# Patient Record
Sex: FEMALE | Race: WHITE
[De-identification: names, ages, dates, MRNs, and addresses within clinical notes are randomized per-mention and may not be internally consistent; named-entity substitution may affect disease eponyms.]

---

## 2023-02-07 ENCOUNTER — HOSPITAL ENCOUNTER (INPATIENT)
Dept: HOSPITAL 54 - TELE | Age: 68
LOS: 11 days | Discharge: HOSPICE | DRG: 871 | End: 2023-02-18
Attending: INTERNAL MEDICINE | Admitting: INTERNAL MEDICINE
Payer: MEDICARE

## 2023-02-07 VITALS — BODY MASS INDEX: 17.43 KG/M2 | WEIGHT: 92.31 LBS | HEIGHT: 61 IN

## 2023-02-07 VITALS — SYSTOLIC BLOOD PRESSURE: 100 MMHG | DIASTOLIC BLOOD PRESSURE: 58 MMHG

## 2023-02-07 DIAGNOSIS — Z99.3: ICD-10-CM

## 2023-02-07 DIAGNOSIS — L89.220: ICD-10-CM

## 2023-02-07 DIAGNOSIS — E88.09: ICD-10-CM

## 2023-02-07 DIAGNOSIS — Z79.899: ICD-10-CM

## 2023-02-07 DIAGNOSIS — J18.9: ICD-10-CM

## 2023-02-07 DIAGNOSIS — E11.22: ICD-10-CM

## 2023-02-07 DIAGNOSIS — E87.6: ICD-10-CM

## 2023-02-07 DIAGNOSIS — J90: ICD-10-CM

## 2023-02-07 DIAGNOSIS — F05: ICD-10-CM

## 2023-02-07 DIAGNOSIS — Z20.822: ICD-10-CM

## 2023-02-07 DIAGNOSIS — N18.6: ICD-10-CM

## 2023-02-07 DIAGNOSIS — I82.422: ICD-10-CM

## 2023-02-07 DIAGNOSIS — A41.50: Primary | ICD-10-CM

## 2023-02-07 DIAGNOSIS — L89.152: ICD-10-CM

## 2023-02-07 DIAGNOSIS — R13.10: ICD-10-CM

## 2023-02-07 DIAGNOSIS — D69.6: ICD-10-CM

## 2023-02-07 DIAGNOSIS — I82.220: ICD-10-CM

## 2023-02-07 DIAGNOSIS — J98.11: ICD-10-CM

## 2023-02-07 DIAGNOSIS — M89.8X9: ICD-10-CM

## 2023-02-07 DIAGNOSIS — B96.1: ICD-10-CM

## 2023-02-07 DIAGNOSIS — F29: ICD-10-CM

## 2023-02-07 DIAGNOSIS — Z99.2: ICD-10-CM

## 2023-02-07 DIAGNOSIS — R41.9: ICD-10-CM

## 2023-02-07 DIAGNOSIS — L89.310: ICD-10-CM

## 2023-02-07 DIAGNOSIS — G92.8: ICD-10-CM

## 2023-02-07 DIAGNOSIS — I12.0: ICD-10-CM

## 2023-02-07 LAB
BASOPHILS # BLD AUTO: 0 K/UL (ref 0–0.2)
BASOPHILS NFR BLD AUTO: 0.2 % (ref 0–2)
BUN SERPL-MCNC: 70 MG/DL (ref 7–18)
CALCIUM SERPL-MCNC: 6.5 MG/DL (ref 8.5–10.1)
CHLORIDE SERPL-SCNC: 102 MMOL/L (ref 98–107)
CO2 SERPL-SCNC: 18 MMOL/L (ref 21–32)
CREAT SERPL-MCNC: 6.6 MG/DL (ref 0.6–1.3)
EOSINOPHIL NFR BLD AUTO: 0.1 % (ref 0–6)
GLUCOSE SERPL-MCNC: 99 MG/DL (ref 74–106)
HCT VFR BLD AUTO: 43 % (ref 33–45)
HGB BLD-MCNC: 13.2 G/DL (ref 11.5–14.8)
LYMPHOCYTES NFR BLD AUTO: 0.7 K/UL (ref 0.8–4.8)
LYMPHOCYTES NFR BLD AUTO: 3.7 % (ref 20–44)
MCHC RBC AUTO-ENTMCNC: 31 G/DL (ref 31–36)
MCV RBC AUTO: 97 FL (ref 82–100)
MONOCYTES NFR BLD AUTO: 0.5 K/UL (ref 0.1–1.3)
MONOCYTES NFR BLD AUTO: 2.7 % (ref 2–12)
NEUTROPHILS # BLD AUTO: 17.9 K/UL (ref 1.8–8.9)
NEUTROPHILS NFR BLD AUTO: 93.3 % (ref 43–81)
PLATELET # BLD AUTO: 150 K/UL (ref 150–450)
POTASSIUM SERPL-SCNC: 3.1 MMOL/L (ref 3.5–5.1)
RBC # BLD AUTO: 4.46 MIL/UL (ref 4–5.2)
SODIUM SERPL-SCNC: 138 MMOL/L (ref 136–145)
WBC NRBC COR # BLD AUTO: 19.1 K/UL (ref 4.3–11)

## 2023-02-07 PROCEDURE — G0378 HOSPITAL OBSERVATION PER HR: HCPCS

## 2023-02-07 PROCEDURE — C1894 INTRO/SHEATH, NON-LASER: HCPCS

## 2023-02-07 PROCEDURE — C1769 GUIDE WIRE: HCPCS

## 2023-02-07 PROCEDURE — A4223 INFUSION SUPPLIES W/O PUMP: HCPCS

## 2023-02-07 PROCEDURE — C1757 CATH, THROMBECTOMY/EMBOLECT: HCPCS

## 2023-02-07 RX ADMIN — MIDODRINE HYDROCHLORIDE SCH MG: 5 TABLET ORAL at 09:31

## 2023-02-07 RX ADMIN — CINACALCET HYDROCHLORIDE SCH MG: 30 TABLET, COATED ORAL at 12:57

## 2023-02-07 RX ADMIN — RENAGEL SCH MG: 800 TABLET ORAL at 17:24

## 2023-02-07 RX ADMIN — HEPARIN SODIUM SCH UNITS: 5000 INJECTION INTRAVENOUS; SUBCUTANEOUS at 11:36

## 2023-02-07 RX ADMIN — DEXTROSE MONOHYDRATE SCH MLS/HR: 50 INJECTION, SOLUTION INTRAVENOUS at 04:00

## 2023-02-07 RX ADMIN — DEXTROSE MONOHYDRATE SCH MLS/HR: 50 INJECTION, SOLUTION INTRAVENOUS at 21:00

## 2023-02-07 RX ADMIN — MIDODRINE HYDROCHLORIDE SCH MG: 5 TABLET ORAL at 12:57

## 2023-02-07 RX ADMIN — HEPARIN SODIUM SCH UNITS: 5000 INJECTION INTRAVENOUS; SUBCUTANEOUS at 19:04

## 2023-02-07 RX ADMIN — AMLODIPINE BESYLATE SCH MG: 5 TABLET ORAL at 09:00

## 2023-02-07 RX ADMIN — VITAMIN D, TAB 1000IU (100/BT) SCH UNIT: 25 TAB at 09:30

## 2023-02-07 RX ADMIN — MIDODRINE HYDROCHLORIDE SCH MG: 5 TABLET ORAL at 04:00

## 2023-02-07 RX ADMIN — RENAGEL SCH MG: 800 TABLET ORAL at 12:57

## 2023-02-07 RX ADMIN — MIDODRINE HYDROCHLORIDE SCH MG: 5 TABLET ORAL at 16:02

## 2023-02-07 RX ADMIN — DEXTROSE MONOHYDRATE SCH MLS/HR: 50 INJECTION, SOLUTION INTRAVENOUS at 10:49

## 2023-02-07 RX ADMIN — RENAGEL SCH MG: 800 TABLET ORAL at 09:30

## 2023-02-07 RX ADMIN — FAMOTIDINE SCH MG: 20 TABLET, FILM COATED ORAL at 09:30

## 2023-02-07 RX ADMIN — FOLIC ACID SCH MG: 1 TABLET ORAL at 09:31

## 2023-02-07 NOTE — NUR
RN TELE NOTES

DR. RODRIGUEZ AND DR. BRUNO INFORMED OF ABNORMAL LAB RESULTS, DR. RODRIGUEZ ORDERED KCL 10MEQ 
REPLACEMENT, SON MARIAM AT BEDSIDE, CONSENT GIVEN FOR HEMODIALYSIS TO BE DONE TOMORROW PER MD, 
PT HAS HD CATH AT THE LEFT GROIN, NO BLEEDING NOTED.

## 2023-02-07 NOTE — NUR
RN TELE NOTES

PT IN BED, RESTING, ALERT TO SELF, WITH CONFUSION, NOT IN DISTRESS, ON ROOM AIR WITH O2 SAT 
OF 97%, SON MARIAM AT BEDSIDE, ASSISTED PT WITH MEALS, NOTED IV SITE INFILTRATED AT LEFT 
FOREARM, RECORDS SHOWED OLD AV SHUNT AT LEFT ARM, CONFIRMED WITH SON, ATTEMPTED IV 
REINSERTION AT RIGHT ARM BUT UNSUCCESSFUL, PT IS HARDSTICK, DR. BRUNO INFORMED, ORDERED 
MIDLINE INSERTION, NURSING SUPERVISOR INFORMED.

## 2023-02-07 NOTE — NUR
TELE RN ADMISSION NOTES



RECEIVED PATIENT AS DIRECT ADMIT FROM [place]. PATIENT IS Polish SPEAKING BUT UNABLE TO 
COMMUNICATE DUE TO MENTAL STATUS. A/O X 1. NO S/S OF PAIN NOTED AT THIS TIME. PATIENT ON RA, 
BREATHING EVEN AND UNLABORED, NO DISTRESS OR SHORTNESS OF BREATH. IV ACCESS LAC #22G, SALINE 
LOCK, INTACT PATENT AND FLUSHING WELL. FALL AND SAFETY MEASURESIN PLACE WITH BED ON LOWEST 
AND LOCKED POSITION. BED ALARM ON. SIDE RAILS UP X 2. TRAY AND CALL LIGHT WITHIN EASY REACH. 
WILL CONTINUE WITH THE PLAN OF CARE.

## 2023-02-07 NOTE — NUR
TELE RN OPENING NOTE



RECEIVED PATIENT SLEEPING IN BED. EASILY AWAKEN BY VERBAL AND TACTILE STIMULI. PATIENT IS 
Prydeinig SPEAKING, A/O X 1. NO S/S OF PAIN NOTED AT THIS TIME. PATIENT ON 4L OXYGEN VIA NC. 
IV ACCESS TO LEFT AC #22 G, INFILTRATED. PT HAS ORDER FOR MIDLINE INSERTION. AWAITING FOR 
MIDLINE NURSE TO COME, AND INSERT MIDLINE. CHARGE NURSE AWARE. HAS AN OLD RIGHT AV SHUNT. ON 
TELE MONITOR, WITH SR @ 67. PT'S BP: 95/57. PATIENT IS ON PUREWICK. SAFETY MEASURES IN 
PLACE: BED ON LOWEST AND LOCKED POSITION. BED ALARM ON. SIDE RAILS UP X 2. CALL LIGHT AND 
TABLE WITHIN EASY REACH. WILL CONTINUE TO MONITOR PT.

## 2023-02-07 NOTE — NUR
RN TELE NOTES

PT IN BED, ASLEEP, EASY TO AROUSE, NO SIGN OF PAIN OR DISTRESS, CALL LIGHT WITHIN REACH, 
KEPT COMFORTABLE IN BED.

## 2023-02-07 NOTE — NUR
TELE RN CLOSING NOTES



PATIENT IS SLEEPING IN BED. EASILY AWAKEN BY VERBAL AND TACTILE STIMULI. PATIENT IS Kittitian 
SPEAKING. A/O X 1. NO S/S OF PAIN NOTED AT THIS TIME. PATIENT ON 4L OXYGEN VIA NC, 
CONTINUOUSLY REMOVING IT. IV ACCESS LAC #22 G, INTACT AND PATENT, FLUSHING WELL. HAS AN OLD 
RIGHT AV SHUNT. ON TELE MONITOR, WITH SR @ 72. PATIENT IS ON PUREWICK. SAFETY MEASURES GIVEN 
WITH BED ON LOWEST AND LOCKED POSITION. BED ALRAM ON. SIDE RAILS UP X 2. CALL LIGHT AND 
TABLE WITHIN EASY REACH. WILL ENDORSE TO THE NEXT SHIFT FOR OPAL.

## 2023-02-08 VITALS — DIASTOLIC BLOOD PRESSURE: 68 MMHG | SYSTOLIC BLOOD PRESSURE: 144 MMHG

## 2023-02-08 VITALS — SYSTOLIC BLOOD PRESSURE: 100 MMHG | DIASTOLIC BLOOD PRESSURE: 46 MMHG

## 2023-02-08 VITALS — DIASTOLIC BLOOD PRESSURE: 74 MMHG | SYSTOLIC BLOOD PRESSURE: 100 MMHG

## 2023-02-08 VITALS — SYSTOLIC BLOOD PRESSURE: 155 MMHG | DIASTOLIC BLOOD PRESSURE: 84 MMHG

## 2023-02-08 LAB
ALBUMIN SERPL BCP-MCNC: 2.3 G/DL (ref 3.4–5)
ALP SERPL-CCNC: 506 U/L (ref 46–116)
ALT SERPL W P-5'-P-CCNC: 50 U/L (ref 12–78)
AST SERPL W P-5'-P-CCNC: 47 U/L (ref 15–37)
BASOPHILS # BLD AUTO: 0 K/UL (ref 0–0.2)
BASOPHILS NFR BLD AUTO: 0 % (ref 0–2)
BILIRUB DIRECT SERPL-MCNC: 0.2 MG/DL (ref 0–0.2)
BILIRUB SERPL-MCNC: 0.4 MG/DL (ref 0.2–1)
BUN SERPL-MCNC: 74 MG/DL (ref 7–18)
CALCIUM SERPL-MCNC: 6.8 MG/DL (ref 8.5–10.1)
CHLORIDE SERPL-SCNC: 99 MMOL/L (ref 98–107)
CO2 SERPL-SCNC: 19 MMOL/L (ref 21–32)
CREAT SERPL-MCNC: 7 MG/DL (ref 0.6–1.3)
EOSINOPHIL NFR BLD AUTO: 0.1 % (ref 0–6)
GLUCOSE SERPL-MCNC: 118 MG/DL (ref 74–106)
HCT VFR BLD AUTO: 43 % (ref 33–45)
HGB BLD-MCNC: 13.4 G/DL (ref 11.5–14.8)
LYMPHOCYTES NFR BLD AUTO: 0.5 K/UL (ref 0.8–4.8)
LYMPHOCYTES NFR BLD AUTO: 3.5 % (ref 20–44)
MAGNESIUM SERPL-MCNC: 2.2 MG/DL (ref 1.8–2.4)
MCHC RBC AUTO-ENTMCNC: 31 G/DL (ref 31–36)
MCV RBC AUTO: 95 FL (ref 82–100)
MONOCYTES NFR BLD AUTO: 0.4 K/UL (ref 0.1–1.3)
MONOCYTES NFR BLD AUTO: 2.7 % (ref 2–12)
NEUTROPHILS # BLD AUTO: 12.4 K/UL (ref 1.8–8.9)
NEUTROPHILS NFR BLD AUTO: 93.7 % (ref 43–81)
PHOSPHATE SERPL-MCNC: 6.3 MG/DL (ref 2.5–4.9)
PLATELET # BLD AUTO: 159 K/UL (ref 150–450)
POTASSIUM SERPL-SCNC: 3.2 MMOL/L (ref 3.5–5.1)
PROT SERPL-MCNC: 5.9 G/DL (ref 6.4–8.2)
RBC # BLD AUTO: 4.55 MIL/UL (ref 4–5.2)
SODIUM SERPL-SCNC: 136 MMOL/L (ref 136–145)
WBC NRBC COR # BLD AUTO: 13.2 K/UL (ref 4.3–11)

## 2023-02-08 PROCEDURE — 05HB33Z INSERTION OF INFUSION DEVICE INTO RIGHT BASILIC VEIN, PERCUTANEOUS APPROACH: ICD-10-PCS | Performed by: NURSE PRACTITIONER

## 2023-02-08 PROCEDURE — 5A1D70Z PERFORMANCE OF URINARY FILTRATION, INTERMITTENT, LESS THAN 6 HOURS PER DAY: ICD-10-PCS

## 2023-02-08 RX ADMIN — VITAMIN D, TAB 1000IU (100/BT) SCH UNIT: 25 TAB at 09:25

## 2023-02-08 RX ADMIN — DEXTROSE MONOHYDRATE SCH MLS/HR: 50 INJECTION, SOLUTION INTRAVENOUS at 09:46

## 2023-02-08 RX ADMIN — CEFEPIME HYDROCHLORIDE SCH MLS/HR: 1 INJECTION, POWDER, FOR SOLUTION INTRAMUSCULAR; INTRAVENOUS at 17:20

## 2023-02-08 RX ADMIN — CINACALCET HYDROCHLORIDE SCH MG: 30 TABLET, COATED ORAL at 09:38

## 2023-02-08 RX ADMIN — MIDODRINE HYDROCHLORIDE SCH MG: 5 TABLET ORAL at 09:26

## 2023-02-08 RX ADMIN — MIDODRINE HYDROCHLORIDE SCH MG: 5 TABLET ORAL at 14:15

## 2023-02-08 RX ADMIN — MIDODRINE HYDROCHLORIDE SCH MG: 5 TABLET ORAL at 17:34

## 2023-02-08 RX ADMIN — HEPARIN SODIUM SCH UNITS: 5000 INJECTION INTRAVENOUS; SUBCUTANEOUS at 09:32

## 2023-02-08 RX ADMIN — AMLODIPINE BESYLATE SCH MG: 5 TABLET ORAL at 09:00

## 2023-02-08 RX ADMIN — RENAGEL SCH MG: 800 TABLET ORAL at 09:27

## 2023-02-08 RX ADMIN — FAMOTIDINE SCH MG: 20 TABLET, FILM COATED ORAL at 09:25

## 2023-02-08 RX ADMIN — HEPARIN SODIUM SCH UNITS: 5000 INJECTION INTRAVENOUS; SUBCUTANEOUS at 17:49

## 2023-02-08 RX ADMIN — RENAGEL SCH MG: 800 TABLET ORAL at 14:15

## 2023-02-08 RX ADMIN — FOLIC ACID SCH MG: 1 TABLET ORAL at 09:27

## 2023-02-08 RX ADMIN — RENAGEL SCH MG: 800 TABLET ORAL at 17:34

## 2023-02-08 NOTE — NUR
TELE RN CLOSING NOTE



LEFT PATIENT SLEEPING IN BED. EASILY AWAKEN BY VERBAL AND TACTILE STIMULI. PATIENT IS 
Faroese SPEAKING, A/O X 1. NO S/S OF PAIN NOTED AT THIS TIME. PATIENT ON 4L OXYGEN VIA NC. 
IV ACCESS TO LEFT AC #22 G, INFILTRATED. PT HAS ORDER FOR MIDLINE INSERTION. AWAITING FOR 
MIDLINE NURSE TO COME, AND INSERT MIDLINE. CHARGE NURSE, AND MD DURHAM AWARE. HAS AN OLD 
RIGHT AV SHUNT. ON TELE MONITOR, WITH SR @ 67. PT'S BP: 95/57. PATIENT IS ON PUREWICK, BUT 
NO URINE OUTPUT. HD PT. PT DID NOT RECEIVE POTASSIUM, AND DOXYCYCLINE IV D/T NO IV ACCESS. 
WILL ENDORSE TO AM NURSE TO F/U. SAFETY MEASURES IN PLACE: BED ON LOWEST AND LOCKED 
POSITION. BED ALARM ON. SIDE RAILS UP X 2. CALL LIGHT AND TABLE WITHIN EASY REACH. WILL 
CONTINUE TO MONITOR PT.

## 2023-02-08 NOTE — NUR
RN NOTE



INFORMED DR RODRIGUEZ THAT HD ORDER HAS NOT BEEN PLACE. WITH NEW ORDER FOR HD TOMORROW. HD 
SETTINGS CLARIFIED AND READ BACK. NEW ORDER NOTED AND CARRIED OUT. CHARGE NURSE JORDEN NAVARRO.

## 2023-02-08 NOTE — NUR
TEL RN NOTE

PT HAS LOW BP, BP: 84/42. MD DURHAM CALLED, AND MADE AWARE. MD STATES NOT TO DO ANYTHING 
REGARDING LOW BP, JUST TO OBSERVE PT. WILL CONTINUE TO MONITOR.

## 2023-02-08 NOTE — NUR
TELE RN CLOSING NOTE



PATIENT AWAKE IN BED WITH FAMILY AT BEDSIDE. PATIENT IS Barbadian SPEAKING, A/O X 1. NO S/S OF 
PAIN NOTED AT THIS TIME. PATIENT ON 4L OXYGEN VIA NC IV ACCESS TO RIGHT UPPER ARM MIDLINE 
PATENT AND INTACT, HAS AN OLD RIGHT AV SHUNT. ON TELE MONITOR, WITH SR @ 66. ALL DUE MEDS 
GIVEN. KEPT COMFORTABLE. SAFETY MEASURES IN PLACE: BED ON LOWEST AND LOCKED POSITION. BED 
ALARM ON. SIDE RAILS UP X 2. CALL LIGHT AND TABLE WITHIN EASY REACH.  ENDORSED TO NEXT NOD 
FOR OPAL.

## 2023-02-08 NOTE — NUR
325-2

TELE RN OPENING NOTE



PATIENT AWAKE IN BED. EASILY AWAKEN BY VERBAL AND TACTILE STIMULI. PATIENT IS Saudi Arabian 
SPEAKING, A/O X 1. NO S/S OF PAIN NOTED AT THIS TIME. PATIENT ON 4L OXYGEN VIA NC. NO IV 
ACCESS NOTED, HAS AN OLD RIGHT AV SHUNT. ON TELE MONITOR, WITH SR @ 66. SAFETY MEASURES IN 
PLACE: BED ON LOWEST AND LOCKED POSITION. BED ALARM ON. SIDE RAILS UP X 2. CALL LIGHT AND 
TABLE WITHIN EASY REACH. WILL CONTINUE TO MONITOR PT.


-------------------------------------------------------------------------------

Addendum: 02/08/23 at 1538 by VIANEY VALLADARES RN

-------------------------------------------------------------------------------

RN OPENING NOTES

## 2023-02-08 NOTE — NUR
RN NOTES



FOLLOWED UP TO LAB FOR THE BLOOD CHEM OF THE PATIENT AS PATIENT REFUSED TO TAKE BLOOD SAMPLE 
IN THE MORNING. WILL MONITOR.

## 2023-02-08 NOTE — NUR
WOUND CARE CONSULT: PT HAVING PROCEDURE AT THIS TIME. WILL SEE PT FOR SKIN ASSESSMENT AS PT 
CONDITION PERMITS.

## 2023-02-08 NOTE — NUR
TELE RN OPENING NOTE



RECEIVED PT RESTING IN BED, VERBALLY RESPONSIVE. A/O X1 AND Hungarian SPEAKING. PT ON O2 @ 4 
LPM, SATURATING 99%. NO SOB OR S/S OF RESPIRATORY DISTRESS. BREATHING EVEN AND UNLABORED. ON 
EXTERNAL CARDIAC MONITOR READING SR 67 BPM. IV ACCESS KENZIE ML SL, INTACT AND PATENT. WITH 
PURWICK IN PLACE, NO URINE OUTPUT. INFORMED NP DURHAM OF POTASSIUM 3.2 AND PROCALCITONIN OF 
58.9 WITH NO NEW ORDERS AT THIS TIME. SAFETY PRECAUTIONS IN PLACE. BED IN LOWEST LOCKED 
POSITION, HOB ELEVATED, SIDE RAILS UP X3, AND CALL LIGHT AND TABLE WITHIN REACH. ALL NEEDS 
MET AT THIS TIME.

## 2023-02-08 NOTE — NUR
WOUND CARE CONSULT: PT PRESENTS WITH DRY NECROTIC WOUNDS TO RT BUTTOCK AND LEFT HIP, DEEP 
TISSUE INJURY WITH SCARRING TO SACRUM AND RT ARM SKIN TEAR, PRESENT ON ADMISSION. 
RECOMMENDATIONS MADE FOR SKIN PROTECTION AND WOUND CARE. DISCUSSED WITH NURSING STAFF. PT IS 
EXTREMELY THIN AND BONY. DIETARY CONSULT IN PLACE. MD IN AGREEMENT WITH PLAN OF CARE. 

-------------------------------------------------------------------------------

Addendum: 02/08/23 at 0832 by PAMELLA AGUILAR WNDNU

-------------------------------------------------------------------------------

Amended: Links added.

## 2023-02-09 VITALS — SYSTOLIC BLOOD PRESSURE: 120 MMHG | DIASTOLIC BLOOD PRESSURE: 30 MMHG

## 2023-02-09 VITALS — SYSTOLIC BLOOD PRESSURE: 138 MMHG | DIASTOLIC BLOOD PRESSURE: 35 MMHG

## 2023-02-09 VITALS — SYSTOLIC BLOOD PRESSURE: 93 MMHG | DIASTOLIC BLOOD PRESSURE: 52 MMHG

## 2023-02-09 LAB
ALBUMIN SERPL BCP-MCNC: 2 G/DL (ref 3.4–5)
ALP SERPL-CCNC: 387 U/L (ref 46–116)
ALT SERPL W P-5'-P-CCNC: 34 U/L (ref 12–78)
AST SERPL W P-5'-P-CCNC: 26 U/L (ref 15–37)
BASOPHILS # BLD AUTO: 0 K/UL (ref 0–0.2)
BASOPHILS NFR BLD AUTO: 0 % (ref 0–2)
BILIRUB SERPL-MCNC: 0.5 MG/DL (ref 0.2–1)
BUN SERPL-MCNC: 77 MG/DL (ref 7–18)
CALCIUM SERPL-MCNC: 6.4 MG/DL (ref 8.5–10.1)
CHLORIDE SERPL-SCNC: 98 MMOL/L (ref 98–107)
CO2 SERPL-SCNC: 20 MMOL/L (ref 21–32)
CREAT SERPL-MCNC: 6.9 MG/DL (ref 0.6–1.3)
EOSINOPHIL NFR BLD AUTO: 0.1 % (ref 0–6)
GLUCOSE SERPL-MCNC: 120 MG/DL (ref 74–106)
HCT VFR BLD AUTO: 40 % (ref 33–45)
HGB BLD-MCNC: 12.5 G/DL (ref 11.5–14.8)
LYMPHOCYTES NFR BLD AUTO: 0.5 K/UL (ref 0.8–4.8)
LYMPHOCYTES NFR BLD AUTO: 3.8 % (ref 20–44)
MAGNESIUM SERPL-MCNC: 2.2 MG/DL (ref 1.8–2.4)
MCHC RBC AUTO-ENTMCNC: 31 G/DL (ref 31–36)
MCV RBC AUTO: 95 FL (ref 82–100)
MONOCYTES NFR BLD AUTO: 0.6 K/UL (ref 0.1–1.3)
MONOCYTES NFR BLD AUTO: 4.3 % (ref 2–12)
NEUTROPHILS # BLD AUTO: 12 K/UL (ref 1.8–8.9)
NEUTROPHILS NFR BLD AUTO: 91.8 % (ref 43–81)
PHOSPHATE SERPL-MCNC: 6.3 MG/DL (ref 2.5–4.9)
PLATELET # BLD AUTO: 130 K/UL (ref 150–450)
POTASSIUM SERPL-SCNC: 3.7 MMOL/L (ref 3.5–5.1)
PROT SERPL-MCNC: 5.6 G/DL (ref 6.4–8.2)
RBC # BLD AUTO: 4.24 MIL/UL (ref 4–5.2)
SODIUM SERPL-SCNC: 134 MMOL/L (ref 136–145)
WBC NRBC COR # BLD AUTO: 13.1 K/UL (ref 4.3–11)

## 2023-02-09 RX ADMIN — RENAGEL SCH MG: 800 TABLET ORAL at 17:25

## 2023-02-09 RX ADMIN — MIDODRINE HYDROCHLORIDE SCH MG: 5 TABLET ORAL at 17:26

## 2023-02-09 RX ADMIN — RENAGEL SCH MG: 800 TABLET ORAL at 13:42

## 2023-02-09 RX ADMIN — MIDODRINE HYDROCHLORIDE SCH MG: 5 TABLET ORAL at 09:10

## 2023-02-09 RX ADMIN — VITAMIN D, TAB 1000IU (100/BT) SCH UNIT: 25 TAB at 09:09

## 2023-02-09 RX ADMIN — HEPARIN SODIUM SCH UNITS: 5000 INJECTION INTRAVENOUS; SUBCUTANEOUS at 09:19

## 2023-02-09 RX ADMIN — CEFEPIME HYDROCHLORIDE SCH MLS/HR: 1 INJECTION, POWDER, FOR SOLUTION INTRAMUSCULAR; INTRAVENOUS at 16:11

## 2023-02-09 RX ADMIN — HEPARIN SODIUM SCH UNITS: 5000 INJECTION INTRAVENOUS; SUBCUTANEOUS at 17:27

## 2023-02-09 RX ADMIN — FLUDROCORTISONE ACETATE SCH MG: 0.1 TABLET ORAL at 13:44

## 2023-02-09 RX ADMIN — FAMOTIDINE SCH MG: 20 TABLET, FILM COATED ORAL at 09:10

## 2023-02-09 RX ADMIN — CINACALCET HYDROCHLORIDE SCH MG: 30 TABLET, COATED ORAL at 09:29

## 2023-02-09 RX ADMIN — MIDODRINE HYDROCHLORIDE SCH MG: 5 TABLET ORAL at 13:43

## 2023-02-09 RX ADMIN — FOLIC ACID SCH MG: 1 TABLET ORAL at 09:09

## 2023-02-09 RX ADMIN — AMLODIPINE BESYLATE SCH MG: 5 TABLET ORAL at 09:00

## 2023-02-09 RX ADMIN — RENAGEL SCH MG: 800 TABLET ORAL at 09:10

## 2023-02-09 NOTE — NUR
RN TELE CLOSING NOTES



PT IN BED, WITH RELATIVE. A/O X 1, DENIES NEEDS. NO PAIN/DISCOMFORT NOTED. IV ACCESS AT KENZIE 
ML, SL, C/D/I. WITH LFA AV SHUNT. ON O2 VIA NC AT 4LPM, NO SIGNS OF ACUTE RESPIRATORY 
DISTRESS. WITH CARDIAC MONITOR ON SR, HR 66. ON PUREWICK WITH YELLOW URINE. NEEDS ATTENDED. 
SAFETY MEASURES IN PLACE: BED LOCKED AND IN LOWEST POSITION, CALL LIGHT AND TRAY TABLE 
WITHIN REACH, SIDE RAILS X 3. WILL ENDORSE OPAL TO NIGHT SHIFT.

## 2023-02-09 NOTE — NUR
RN NOTE



HD CAME BUT WAS UNABLE TO GET BLOOD RETURN FROM [facility] HD CATH. JAC HD NURSE INFORMED ME 
THAT HE WOULD INFORM DR RODRIGUEZ. HD COULD NOT BE PERFORMED AT THIS TIME. CHARGE NURSE 
JORDEN NAVARRO.

## 2023-02-09 NOTE — NUR
RN OPENING NOTE



PT IS ASLEEP, A/O X 1,  CONFUSED AND Kyrgyz SPEAKING. PT IN NC 4L 02, TOLERATING WELL, 
BREATHING EVEN AND UNLABORED @ THIS TIME. PT W/ IV ACCESS PRESENT @ KENZIE MIDLINE SL, PATENT, 
INTACT & FLUSHES WELL. NO S/S OF INFILTRATION NOTED. AV SHUNT PRESENT IN LFA, NO BLEEDING 
NOTED. SAFETY MEASURES INITIATED, BED IN ITS LOWEST AND LOCKED POSITION, BED ALARM ON, 
SIDERAILS UP X 3. BEDSIDE TABLE AND CALL LIGHT EASY TO REACH. TO CONTINUE TO MONITOR PT 
ACCORDINGLY.


-------------------------------------------------------------------------------

Addendum: 02/09/23 at 2101 by TANYA CARVAJAL RN

-------------------------------------------------------------------------------

OPENING NOTES 1900

## 2023-02-09 NOTE — NUR
TELE RN CLOSING NOTE



PT RESTING IN BED, VERBALLY RESPONSIVE. A/O X1 AND Swedish SPEAKING. PT ON O2 @ 4 LPM, 
SATURATING 100%. NO SOB OR S/S OF RESPIRATORY DISTRESS. BREATHING EVEN AND UNLABORED. ON 
EXTERNAL CARDIAC MONITOR READING SR 60 BPM. IV ACCESS KENZIE ML SL, INTACT AND PATENT. WITH L 
GROIN HD CATH WHICH IS NOT CURRENTLY WORKING AND AN OLD AV SHUNT OF LFA. WITH PURWICK IN 
PLACE, NO URINE OUTPUT. ALL DUE MEDS GIVEN AS ORDERED. KEPT CLEAN AND DRY. SAFETY 
PRECAUTIONS IN PLACE AT ALL TIMES. BED IN LOWEST LOCKED POSITION, HOB ELEVATED, SIDE RAILS 
UP X3, AND CALL LIGHT AND TABLE WITHIN REACH. ALL NEEDS MET AT THIS TIME AND WILL ENDORSE TO 
ONCOMING NURSE FOR OPAL.

## 2023-02-09 NOTE — NUR
TELE RN OPENING NOTES



PT RECEIVED IN BED, AWAKE. A/O X 1, DENIES NEEDS. NO PAIN/DISCOMFORT NOTED AT THIS TIME. IV 
ACCESS AT KENZIE ML, SL, C/D/I. WITH LFA AV SHUNT. ON RA, NO SIGNS OF ACUTE RESPIRATORY 
DISTRESS. WITH CARDIAC MONITOR ON SR, HR 60. ON PUREWICK WITH YELLOW URINE. PT RE-ORIENTED 
ON PLACE, DATE AND TIME. SAFETY MEASURES IN PLACE: BED LOCKED AND IN LOWEST POSITION, CALL 
LIGHT AND TRAY TABLE WITHIN REACH, SIDE RAILS X 3. WILL CONTINUE TO MONITOR.

## 2023-02-09 NOTE — NUR
RN notes

Pt's BP at 2200 was 93/52 and HR 65. Dr. Abernathy is aware and informed. No new orders 
received. Will continue to monitor.

## 2023-02-10 VITALS — SYSTOLIC BLOOD PRESSURE: 129 MMHG | DIASTOLIC BLOOD PRESSURE: 86 MMHG

## 2023-02-10 VITALS — SYSTOLIC BLOOD PRESSURE: 137 MMHG | DIASTOLIC BLOOD PRESSURE: 75 MMHG

## 2023-02-10 LAB
ALBUMIN SERPL BCP-MCNC: 2.2 G/DL (ref 3.4–5)
ALP SERPL-CCNC: 296 U/L (ref 46–116)
ALT SERPL W P-5'-P-CCNC: 19 U/L (ref 12–78)
AST SERPL W P-5'-P-CCNC: 12 U/L (ref 15–37)
BASOPHILS # BLD AUTO: 0 K/UL (ref 0–0.2)
BASOPHILS NFR BLD AUTO: 0.1 % (ref 0–2)
BILIRUB SERPL-MCNC: 0.4 MG/DL (ref 0.2–1)
BUN SERPL-MCNC: 87 MG/DL (ref 7–18)
CALCIUM SERPL-MCNC: 6 MG/DL (ref 8.5–10.1)
CHLORIDE SERPL-SCNC: 98 MMOL/L (ref 98–107)
CO2 SERPL-SCNC: 20 MMOL/L (ref 21–32)
CREAT SERPL-MCNC: 7.2 MG/DL (ref 0.6–1.3)
EOSINOPHIL NFR BLD AUTO: 0.1 % (ref 0–6)
GLUCOSE SERPL-MCNC: 107 MG/DL (ref 74–106)
HCT VFR BLD AUTO: 40 % (ref 33–45)
HGB BLD-MCNC: 12.6 G/DL (ref 11.5–14.8)
LYMPHOCYTES NFR BLD AUTO: 0.6 K/UL (ref 0.8–4.8)
LYMPHOCYTES NFR BLD AUTO: 5.4 % (ref 20–44)
MAGNESIUM SERPL-MCNC: 2.4 MG/DL (ref 1.8–2.4)
MCHC RBC AUTO-ENTMCNC: 31 G/DL (ref 31–36)
MCV RBC AUTO: 95 FL (ref 82–100)
MONOCYTES NFR BLD AUTO: 0.6 K/UL (ref 0.1–1.3)
MONOCYTES NFR BLD AUTO: 5.2 % (ref 2–12)
NEUTROPHILS # BLD AUTO: 10.2 K/UL (ref 1.8–8.9)
NEUTROPHILS NFR BLD AUTO: 89.2 % (ref 43–81)
PHOSPHATE SERPL-MCNC: 5.7 MG/DL (ref 2.5–4.9)
PLATELET # BLD AUTO: 114 K/UL (ref 150–450)
POTASSIUM SERPL-SCNC: 3.2 MMOL/L (ref 3.5–5.1)
PROT SERPL-MCNC: 5.8 G/DL (ref 6.4–8.2)
RBC # BLD AUTO: 4.25 MIL/UL (ref 4–5.2)
SODIUM SERPL-SCNC: 135 MMOL/L (ref 136–145)
WBC NRBC COR # BLD AUTO: 11.4 K/UL (ref 4.3–11)

## 2023-02-10 RX ADMIN — CEFEPIME HYDROCHLORIDE SCH MLS/HR: 1 INJECTION, POWDER, FOR SOLUTION INTRAMUSCULAR; INTRAVENOUS at 17:05

## 2023-02-10 RX ADMIN — MIDODRINE HYDROCHLORIDE SCH MG: 5 TABLET ORAL at 17:03

## 2023-02-10 RX ADMIN — MIDODRINE HYDROCHLORIDE SCH MG: 5 TABLET ORAL at 08:28

## 2023-02-10 RX ADMIN — RENAGEL SCH MG: 800 TABLET ORAL at 13:21

## 2023-02-10 RX ADMIN — RENAGEL SCH MG: 800 TABLET ORAL at 08:14

## 2023-02-10 RX ADMIN — VITAMIN D, TAB 1000IU (100/BT) SCH UNIT: 25 TAB at 08:15

## 2023-02-10 RX ADMIN — FAMOTIDINE SCH MG: 20 TABLET, FILM COATED ORAL at 08:18

## 2023-02-10 RX ADMIN — CINACALCET HYDROCHLORIDE SCH MG: 30 TABLET, COATED ORAL at 08:35

## 2023-02-10 RX ADMIN — HEPARIN SODIUM SCH UNITS: 5000 INJECTION INTRAVENOUS; SUBCUTANEOUS at 08:17

## 2023-02-10 RX ADMIN — FLUDROCORTISONE ACETATE SCH MG: 0.1 TABLET ORAL at 08:12

## 2023-02-10 RX ADMIN — HEPARIN SODIUM SCH UNITS: 5000 INJECTION INTRAVENOUS; SUBCUTANEOUS at 17:07

## 2023-02-10 RX ADMIN — MIDODRINE HYDROCHLORIDE SCH MG: 5 TABLET ORAL at 13:27

## 2023-02-10 RX ADMIN — FOLIC ACID SCH MG: 1 TABLET ORAL at 08:13

## 2023-02-10 RX ADMIN — RENAGEL SCH MG: 800 TABLET ORAL at 17:02
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
## 2023-02-10 NOTE — NUR
RN CLOSING NOTE



PT IS ASLEEP IN BED. RESPONSIVE AND FOLLOWS VERBAL COMMAND, A/O X 1. NO S/S OF RESPIRATORY 
DISTRESS NOTED. ON TELE MONITOR CURRENT READING SR HR 64. IV SITE ON KENZIE ML SL, PATENT AND 
FLUSHES WELL WITH NO S/S OF INFILTRATION NOTED. AV SHUNT ON LFA IS IN PLACE. PT IS KEPT 
CLEAN, DRY AND COMFORTABLE. SAFETY MEASURES MAINTAINED WITH BED IN LOWEST AND LOCK POSITION. 
BED ALARM ON. CALL LIGHT AND TABLE WITHIN REACH. SIDE RAILS UP X 2 . WILL ENDORSE TO THE 
NEXT SHIFT FOR CONTINUITY OF CARE.

## 2023-02-10 NOTE — NUR
RN CLOSING NOTE



PT IS ASLEEP IN BED. RESPONSIVE AND FOLLOWS VERBAL COMMAND, A/O X 1. NO S/S OF RESPIRATORY 
DISTRESS NOTED. ON TELE MONITOR CURRENT READING SR HR 66. IV SITE ON KENZIE ML SL, PATENT AND 
FLUSHES WELL WITH NO S/S OF INFILTRATION NOTED.  PT IS KEPT CLEAN, DRY AND COMFORTABLE. 
SAFETY MEASURES MAINTAINED WITH BED IN LOWEST AND LOCK POSITION. BED ALARM ON. CALL LIGHT 
AND TABLE WITHIN REACH. SIDE RAILS UP X 2 . WILL ENDORSE TO THE NIGHT SHIFT RN FOR 
CONTINUITY OF CARE.

## 2023-02-10 NOTE — NUR
TELE RN OPENING NOTES



PT RECEIVED IN BED, AWAKE. A/O X 1, DENIES NEEDS. NO PAIN/DISCOMFORT NOTED AT THIS TIME. IV 
ACCESS AT KENZIE ML, SL, C/D/I. ON RA, NO SIGNS OF ACUTE RESPIRATORY DISTRESS. WITH CARDIAC 
MONITOR ON SR, HR 70. ON PUREWICK WITH YELLOW URINE. PT RE-ORIENTED ON PLACE, DATE AND TIME. 
SAFETY MEASURES IN PLACE: BED LOCKED AND IN LOWEST POSITION, CALL LIGHT AND TRAY TABLE 
WITHIN REACH, SIDE RAILS X 3. WILL CONTINUE TO MONITOR.

## 2023-02-10 NOTE — NUR
MS LVN INITIAL NOTES

 RECEIVED REPORT FROM AM NURSE AND SEEN PATIENT IN BED LETHARGIC BUT AROUSE WHEN YOU TOUCH 
HER AND CALL HER NAME.  SKIN WARM AND DRY TO TOUCH. NOT IN ANY ACUTE DISTRESS OR DISCOMFORT 
NOTED. KENZIE MIDLINE PATENT AND INTACT. KEPT HER WARM AND COMFORTABLE AT ALL TIMES.  BED IN 
LOW AND LOCK IN POSITION WITH SIDE RAILS X2 UP , BED ALARM SET FOR SAFETY. WILL CONTINUE 
MONITORING.

## 2023-02-10 NOTE — NUR
PATIENT UNABLE TO SIGN CONSENT RE: RESULT OF CULTURE SENSITIVITY FROM [place]. SPOKE WITH THE 
SON, MARIAM ECHEVERRIA OVER THE PHONE WHO PROVIDED THE CONSENT. RN'S DANIELE & TERESITA BOTH 
SIGNED THE REQUEST TO [place]

## 2023-02-11 VITALS — DIASTOLIC BLOOD PRESSURE: 81 MMHG | SYSTOLIC BLOOD PRESSURE: 129 MMHG

## 2023-02-11 VITALS — DIASTOLIC BLOOD PRESSURE: 60 MMHG | SYSTOLIC BLOOD PRESSURE: 102 MMHG

## 2023-02-11 VITALS — DIASTOLIC BLOOD PRESSURE: 66 MMHG | SYSTOLIC BLOOD PRESSURE: 117 MMHG

## 2023-02-11 VITALS — DIASTOLIC BLOOD PRESSURE: 72 MMHG | SYSTOLIC BLOOD PRESSURE: 111 MMHG

## 2023-02-11 VITALS — DIASTOLIC BLOOD PRESSURE: 54 MMHG | SYSTOLIC BLOOD PRESSURE: 100 MMHG

## 2023-02-11 LAB
ALBUMIN SERPL BCP-MCNC: 2.1 G/DL (ref 3.4–5)
ALP SERPL-CCNC: 246 U/L (ref 46–116)
ALT SERPL W P-5'-P-CCNC: 13 U/L (ref 12–78)
AST SERPL W P-5'-P-CCNC: 10 U/L (ref 15–37)
BASOPHILS # BLD AUTO: 0 K/UL (ref 0–0.2)
BASOPHILS NFR BLD AUTO: 0 % (ref 0–2)
BILIRUB SERPL-MCNC: 0.4 MG/DL (ref 0.2–1)
BUN SERPL-MCNC: 74 MG/DL (ref 7–18)
CALCIUM SERPL-MCNC: 6.2 MG/DL (ref 8.5–10.1)
CHLORIDE SERPL-SCNC: 100 MMOL/L (ref 98–107)
CO2 SERPL-SCNC: 19 MMOL/L (ref 21–32)
CREAT SERPL-MCNC: 6.3 MG/DL (ref 0.6–1.3)
EOSINOPHIL NFR BLD AUTO: 0.1 % (ref 0–6)
GLUCOSE SERPL-MCNC: 70 MG/DL (ref 74–106)
HCT VFR BLD AUTO: 42 % (ref 33–45)
HGB BLD-MCNC: 12.9 G/DL (ref 11.5–14.8)
LYMPHOCYTES NFR BLD AUTO: 0.6 K/UL (ref 0.8–4.8)
LYMPHOCYTES NFR BLD AUTO: 6.1 % (ref 20–44)
LYMPHOCYTES NFR BLD MANUAL: 8 % (ref 16–48)
MAGNESIUM SERPL-MCNC: 2.3 MG/DL (ref 1.8–2.4)
MCHC RBC AUTO-ENTMCNC: 31 G/DL (ref 31–36)
MCV RBC AUTO: 96 FL (ref 82–100)
MONOCYTES NFR BLD AUTO: 0.6 K/UL (ref 0.1–1.3)
MONOCYTES NFR BLD AUTO: 6 % (ref 2–12)
MONOCYTES NFR BLD MANUAL: 3 % (ref 0–11)
NEUTROPHILS # BLD AUTO: 8.3 K/UL (ref 1.8–8.9)
NEUTROPHILS NFR BLD AUTO: 87.8 % (ref 43–81)
NEUTS SEG NFR BLD MANUAL: 89 % (ref 42–76)
PHOSPHATE SERPL-MCNC: 5.1 MG/DL (ref 2.5–4.9)
PLATELET # BLD AUTO: 77 K/UL (ref 150–450)
POTASSIUM SERPL-SCNC: 3.3 MMOL/L (ref 3.5–5.1)
PROT SERPL-MCNC: 5.7 G/DL (ref 6.4–8.2)
RBC # BLD AUTO: 4.4 MIL/UL (ref 4–5.2)
SODIUM SERPL-SCNC: 136 MMOL/L (ref 136–145)
WBC NRBC COR # BLD AUTO: 9.5 K/UL (ref 4.3–11)

## 2023-02-11 PROCEDURE — 06HM33Z INSERTION OF INFUSION DEVICE INTO RIGHT FEMORAL VEIN, PERCUTANEOUS APPROACH: ICD-10-PCS | Performed by: NURSE PRACTITIONER

## 2023-02-11 PROCEDURE — B54BZZA ULTRASONOGRAPHY OF RIGHT LOWER EXTREMITY VEINS, GUIDANCE: ICD-10-PCS | Performed by: NURSE PRACTITIONER

## 2023-02-11 RX ADMIN — RENAGEL SCH MG: 800 TABLET ORAL at 13:17

## 2023-02-11 RX ADMIN — CINACALCET HYDROCHLORIDE SCH MG: 30 TABLET, COATED ORAL at 10:24

## 2023-02-11 RX ADMIN — RENAGEL SCH MG: 800 TABLET ORAL at 10:24

## 2023-02-11 RX ADMIN — MIDODRINE HYDROCHLORIDE SCH MG: 5 TABLET ORAL at 10:16

## 2023-02-11 RX ADMIN — FAMOTIDINE SCH MG: 20 TABLET, FILM COATED ORAL at 10:16

## 2023-02-11 RX ADMIN — MIDODRINE HYDROCHLORIDE SCH MG: 5 TABLET ORAL at 13:17

## 2023-02-11 RX ADMIN — FLUDROCORTISONE ACETATE SCH MG: 0.1 TABLET ORAL at 10:17

## 2023-02-11 RX ADMIN — MIDODRINE HYDROCHLORIDE SCH MG: 5 TABLET ORAL at 16:15

## 2023-02-11 RX ADMIN — CEFEPIME HYDROCHLORIDE SCH MLS/HR: 1 INJECTION, POWDER, FOR SOLUTION INTRAMUSCULAR; INTRAVENOUS at 15:48

## 2023-02-11 RX ADMIN — VITAMIN D, TAB 1000IU (100/BT) SCH UNIT: 25 TAB at 10:17

## 2023-02-11 RX ADMIN — HEPARIN SODIUM SCH UNITS: 5000 INJECTION INTRAVENOUS; SUBCUTANEOUS at 16:16

## 2023-02-11 RX ADMIN — RENAGEL SCH MG: 800 TABLET ORAL at 16:15

## 2023-02-11 RX ADMIN — FOLIC ACID SCH MG: 1 TABLET ORAL at 10:17

## 2023-02-11 RX ADMIN — HEPARIN SODIUM SCH UNITS: 5000 INJECTION INTRAVENOUS; SUBCUTANEOUS at 10:18

## 2023-02-11 NOTE — NUR
TELE LVN NOTES

DIALYSIS NURSE CAME AND START THE DIALYSIS AT THIS TIME, PT SLEEPING COMFORTABLY IN BED 
WITHOUT ANY ACUTE DISTRESS NOTED. BLOOD PRESSURE 116/57 AND HEART RATE 62. WILL CONTINUE 
MONITORING.

## 2023-02-11 NOTE — NUR
TELE RN OPENING NOTES



PT RECEIVED IN BED, AWAKE. A/O X 1, DENIES NEEDS. NO PAIN/DISCOMFORT NOTED AT THIS TIME. IV 
ACCESS AT KENZIE ML, SL, C/D/I. ON RA, NO SIGNS OF ACUTE RESPIRATORY DISTRESS. WITH CARDIAC 
MONITOR ON SR, HR 72. ON PUREWICK WITH YELLOW URINE. PT RE-ORIENTED ON PLACE, DATE AND TIME. 
SAFETY MEASURES IN PLACE: BED LOCKED AND IN LOWEST POSITION, CALL LIGHT AND TRAY TABLE 
WITHIN REACH, SIDE RAILS X 3. WILL CONTINUE TO MONITOR THE PATIENT.

## 2023-02-11 NOTE — NUR
RECEIVED PATIENT IN BED, ASLEEP, AROUSEABLE BY VOICE, ROOM AIR, NO COMPLAIN OF PAIN, Prydeinig 
SPEAKING ONLY, REPORTED BY AM NURSE, POTASSIUM 3.3, NOTIFIED NP HERMINIO, NO ORDER AT THIS 
TIME, PATIENT ON HD. WILL CONTINUE TO MONITOR.

## 2023-02-11 NOTE — NUR
TELE LVN CLOSING NOTES

 PT BACK TO REST AFTER MORNING CARE DONE AS WELL  AS DIALYSIS. PER DIALYSIS NURSE HE STOPPED 
THE DIALYSIS BECAUSE OF PT BLOOD PRESSURE BUT PATIENT MORE RESPONSIVE , NO ACUTE DISTRESS 
NOTED. HE ALSO NOTIFIED THE MD REGARDING THE SITUATION, CHARGE NURSE MARY ANN AWARE TOO. TELE 
SINUS RHYTHM PER MONITOR. ENDORSE TO AM NURSE FOR CONTINUITY OF CARE.

## 2023-02-11 NOTE — NUR
tele lvn notes

Hemodialysis insertion on her right groin done by oNel Mason/ [facility]  Patient tolerated 
well no signs of any acute distress or any discomfort noted. will continue monitoring.

## 2023-02-11 NOTE — NUR
tele lvn notes

 Got telephone ordered Stat [facility]  by Noel Arias /[facility] . called Radiology dept right 
away. Orders noted and carried out.

## 2023-02-11 NOTE — NUR
RN CLOSING NOTE



PT IS ASLEEP IN BED. RESPONSIVE AND FOLLOWS VERBAL COMMAND, A/O X 1. NO S/S OF RESPIRATORY 
DISTRESS NOTED. ON TELE MONITOR CURRENT READING SR HR 69. IV SITE ON KENZIE ML SL, PATENT AND 
FLUSHES WELL WITH NO S/S OF INFILTRATION NOTED.  PT IS KEPT CLEAN, DRY AND COMFORTABLE. 
SAFETY MEASURES MAINTAINED WITH BED IN LOWEST AND LOCK POSITION. BED ALARM ON. CALL LIGHT 
AND TABLE WITHIN REACH. SIDE RAILS UP X 2 . WILL ENDORSE TO THE NIGHT SHIFT RN FOR 
CONTINUITY OF CARE.

## 2023-02-12 VITALS — SYSTOLIC BLOOD PRESSURE: 121 MMHG | DIASTOLIC BLOOD PRESSURE: 32 MMHG

## 2023-02-12 VITALS — DIASTOLIC BLOOD PRESSURE: 54 MMHG | SYSTOLIC BLOOD PRESSURE: 98 MMHG

## 2023-02-12 VITALS — SYSTOLIC BLOOD PRESSURE: 116 MMHG | DIASTOLIC BLOOD PRESSURE: 62 MMHG

## 2023-02-12 RX ADMIN — CINACALCET HYDROCHLORIDE SCH MG: 30 TABLET, COATED ORAL at 08:54

## 2023-02-12 RX ADMIN — RENAGEL SCH MG: 800 TABLET ORAL at 16:24

## 2023-02-12 RX ADMIN — FOLIC ACID SCH MG: 1 TABLET ORAL at 08:57

## 2023-02-12 RX ADMIN — RENAGEL SCH MG: 800 TABLET ORAL at 13:00

## 2023-02-12 RX ADMIN — RENAGEL SCH MG: 800 TABLET ORAL at 08:55

## 2023-02-12 RX ADMIN — CEFEPIME HYDROCHLORIDE SCH MLS/HR: 1 INJECTION, POWDER, FOR SOLUTION INTRAMUSCULAR; INTRAVENOUS at 16:21

## 2023-02-12 RX ADMIN — FAMOTIDINE SCH MG: 20 TABLET, FILM COATED ORAL at 08:56

## 2023-02-12 RX ADMIN — HEPARIN SODIUM SCH UNITS: 5000 INJECTION INTRAVENOUS; SUBCUTANEOUS at 16:25

## 2023-02-12 RX ADMIN — MIDODRINE HYDROCHLORIDE SCH MG: 5 TABLET ORAL at 08:56

## 2023-02-12 RX ADMIN — FLUDROCORTISONE ACETATE SCH MG: 0.1 TABLET ORAL at 08:57

## 2023-02-12 RX ADMIN — MIDODRINE HYDROCHLORIDE SCH MG: 5 TABLET ORAL at 13:00

## 2023-02-12 RX ADMIN — MIDODRINE HYDROCHLORIDE SCH MG: 5 TABLET ORAL at 16:25

## 2023-02-12 RX ADMIN — HEPARIN SODIUM SCH UNITS: 5000 INJECTION INTRAVENOUS; SUBCUTANEOUS at 08:57

## 2023-02-12 RX ADMIN — VITAMIN D, TAB 1000IU (100/BT) SCH UNIT: 25 TAB at 08:55

## 2023-02-12 NOTE — NUR
NEXT OF KIN (TWO SONS ON THE CHART) INDICATED TO AVOID THE NECK AREA FOR PERMACATH 
PLACEMENT. CONSENT FORMS SIGNED

## 2023-02-12 NOTE — NUR
TELE RN OPENING NOTES

RECEIVED PATIENT  IN BED AWAKE. PATIENT IS  A/O X 1, SPEAKING Montserratian.NO PAIN AND DISCOMFORT 
NOTED AT THIS TIME. IV ACCESS AT KENZIE ML, SL, C/D/I. ON RA, NO SIGNS OF RESPIRATORY DISTRESS 
NOTED. PATIENT ON PUREWICK WITH YELLOW URINE. ALL  SAFETY MEASURES IN PLACE: BED LOCKED AND 
IN LOWEST POSITION, CALL LIGHT AND TRAY TABLE WITHIN REACH, SIDE RAILS X 3. WILL CONTINUE TO 
MONITOR CLOSELY.

## 2023-02-12 NOTE — NUR
RN CLOSING NOTE



PT IS ASLEEP IN BED. RESPONSIVE AND FOLLOWS VERBAL COMMAND, A/O X 1. NO S/S OF RESPIRATORY 
DISTRESS NOTED. ON TELE MONITOR CURRENT READING SR HR 72. PATIENT IS NPO STARTING MIDNIGHT 
TODAY FOR PERMACATH PLACEMENT INITIALLY SCHEDULE AT 1600 ON 02/13/23. SON MARIELOS SIGNED ALL 
CONSENT FORMS.



IV SITE ON KENZIE ML SL, PATENT AND FLUSHES WELL WITH NO S/S OF INFILTRATION NOTED.  PT IS KEPT 
CLEAN, DRY AND COMFORTABLE. SAFETY MEASURES MAINTAINED WITH BED IN LOWEST AND LOCK POSITION. 
BED ALARM ON. CALL LIGHT AND TABLE WITHIN REACH. SIDE RAILS UP X 2 . WILL ENDORSE TO THE 
NIGHT SHIFT RN FOR CONTINUITY OF CARE.

## 2023-02-12 NOTE — NUR
TELE RN OPENING NOTES



PT RECEIVED IN BED, ASLEEP BUT AROUSABLE ON VERBAL & TACTILE STIMULI. A/O X 1, SPEAKING 
Martiniquais-CNA AS .NO PAIN/DISCOMFORT NOTED AT THIS TIME. IV ACCESS AT KENZIE ML, SL, 
C/D/I. ON RA, NO SIGNS OF ACUTE RESPIRATORY DISTRESS. ON PUREWICK WITH YELLOW URINE. PT 
RE-ORIENTED ON PLACE, DATE AND TIME. SAFETY MEASURES IN PLACE: BED LOCKED AND IN LOWEST 
POSITION, CALL LIGHT AND TRAY TABLE WITHIN REACH, SIDE RAILS X 3. WILL CONTINUE TO MONITOR 
THE PATIENT.

## 2023-02-12 NOTE — NUR
ALERT/ORIENTED X1, CONFUSED, ROOM AIR, NO COMPLAIN OF PAIN, ESRD ON HD, OLIGURIC, ENCOURAGED 
PO INTAKE, WOUND CARE, CONTINUE MAXIPIME. MONITOR FOR BLEEDING OLD HD CATH TO LEFT GROIN, 
NEW HD CATH RIGHT GROIN.

## 2023-02-13 VITALS — DIASTOLIC BLOOD PRESSURE: 52 MMHG | SYSTOLIC BLOOD PRESSURE: 100 MMHG

## 2023-02-13 LAB
ALBUMIN SERPL BCP-MCNC: 2.2 G/DL (ref 3.4–5)
ALP SERPL-CCNC: 389 U/L (ref 46–116)
ALT SERPL W P-5'-P-CCNC: 13 U/L (ref 12–78)
AST SERPL W P-5'-P-CCNC: 12 U/L (ref 15–37)
BASOPHILS # BLD AUTO: 0 K/UL (ref 0–0.2)
BASOPHILS NFR BLD AUTO: 0.1 % (ref 0–2)
BILIRUB SERPL-MCNC: 0.5 MG/DL (ref 0.2–1)
BUN SERPL-MCNC: 87 MG/DL (ref 7–18)
CALCIUM SERPL-MCNC: 6.9 MG/DL (ref 8.5–10.1)
CHLORIDE SERPL-SCNC: 99 MMOL/L (ref 98–107)
CO2 SERPL-SCNC: 20 MMOL/L (ref 21–32)
CREAT SERPL-MCNC: 7.1 MG/DL (ref 0.6–1.3)
EOSINOPHIL NFR BLD AUTO: 0.1 % (ref 0–6)
GLUCOSE SERPL-MCNC: 86 MG/DL (ref 74–106)
HCT VFR BLD AUTO: 37 % (ref 33–45)
HGB BLD-MCNC: 11.7 G/DL (ref 11.5–14.8)
LYMPHOCYTES NFR BLD AUTO: 0.4 K/UL (ref 0.8–4.8)
LYMPHOCYTES NFR BLD AUTO: 7.8 % (ref 20–44)
MAGNESIUM SERPL-MCNC: 2.4 MG/DL (ref 1.8–2.4)
MCHC RBC AUTO-ENTMCNC: 32 G/DL (ref 31–36)
MCV RBC AUTO: 94 FL (ref 82–100)
MONOCYTES NFR BLD AUTO: 0.5 K/UL (ref 0.1–1.3)
MONOCYTES NFR BLD AUTO: 8.3 % (ref 2–12)
NEUTROPHILS # BLD AUTO: 4.7 K/UL (ref 1.8–8.9)
NEUTROPHILS NFR BLD AUTO: 83.7 % (ref 43–81)
PHOSPHATE SERPL-MCNC: 6 MG/DL (ref 2.5–4.9)
PLATELET # BLD AUTO: 53 K/UL (ref 150–450)
POTASSIUM SERPL-SCNC: 3.6 MMOL/L (ref 3.5–5.1)
PROT SERPL-MCNC: 5.7 G/DL (ref 6.4–8.2)
RBC # BLD AUTO: 3.91 MIL/UL (ref 4–5.2)
SODIUM SERPL-SCNC: 135 MMOL/L (ref 136–145)
WBC NRBC COR # BLD AUTO: 5.6 K/UL (ref 4.3–11)

## 2023-02-13 PROCEDURE — 05HB33Z INSERTION OF INFUSION DEVICE INTO RIGHT BASILIC VEIN, PERCUTANEOUS APPROACH: ICD-10-PCS | Performed by: NURSE PRACTITIONER

## 2023-02-13 RX ADMIN — CINACALCET HYDROCHLORIDE SCH MG: 30 TABLET, COATED ORAL at 09:00

## 2023-02-13 RX ADMIN — RENAGEL SCH MG: 800 TABLET ORAL at 17:00

## 2023-02-13 RX ADMIN — FLUDROCORTISONE ACETATE SCH MG: 0.1 TABLET ORAL at 09:00

## 2023-02-13 RX ADMIN — CEFEPIME HYDROCHLORIDE SCH MLS/HR: 1 INJECTION, POWDER, FOR SOLUTION INTRAMUSCULAR; INTRAVENOUS at 19:12

## 2023-02-13 RX ADMIN — FAMOTIDINE SCH MG: 20 TABLET, FILM COATED ORAL at 09:00

## 2023-02-13 RX ADMIN — HEPARIN SODIUM SCH UNITS: 5000 INJECTION INTRAVENOUS; SUBCUTANEOUS at 09:00

## 2023-02-13 RX ADMIN — MIDODRINE HYDROCHLORIDE SCH MG: 5 TABLET ORAL at 09:00

## 2023-02-13 RX ADMIN — HEPARIN SODIUM SCH UNITS: 5000 INJECTION INTRAVENOUS; SUBCUTANEOUS at 17:00

## 2023-02-13 RX ADMIN — RENAGEL SCH MG: 800 TABLET ORAL at 13:00

## 2023-02-13 RX ADMIN — RENAGEL SCH MG: 800 TABLET ORAL at 09:00

## 2023-02-13 RX ADMIN — MIDODRINE HYDROCHLORIDE SCH MG: 5 TABLET ORAL at 13:00

## 2023-02-13 RX ADMIN — VITAMIN D, TAB 1000IU (100/BT) SCH UNIT: 25 TAB at 09:00

## 2023-02-13 RX ADMIN — FOLIC ACID SCH MG: 1 TABLET ORAL at 09:00

## 2023-02-13 RX ADMIN — MIDODRINE HYDROCHLORIDE SCH MG: 5 TABLET ORAL at 17:00

## 2023-02-13 NOTE — NUR
RN NOTES:



PT REFUSED ALL PO MEDS DURING SHIFT, CNA TRANSLATED TO PT IMPORTANCE OF MEDICATION, PT 
REFUSED TO OPEN MOUTH AND REFUSED FOOD AT PM.



HEPARIN HELD FOR PENDING PROCEDURE.

## 2023-02-13 NOTE — NUR
TELE RN CLOSING NOTES

 PATIENT  IN BED AWAKE. PATIENT IS  A/O X 1, SPEAKING Irish.NO PAIN AND DISCOMFORT NOTED 
AT THIS TIME. PATIENT REMOVED MID LINE AWAITING FOR REINSERTION. ON RA, NO SIGNS OF 
RESPIRATORY DISTRESS NOTED. NPO SINCE MIDNIGHT FOR SURGERY TODAY. ALL  SAFETY MEASURES IN 
PLACE: BED LOCKED AND IN LOWEST POSITION, CALL LIGHT AND TRAY TABLE WITHIN REACH, SIDE RAILS 
X 3. WILL ENDORSE FOR OPAL.

## 2023-02-13 NOTE — NUR
TELE RN CLOSING NOTES:



PATIENT  IN BED ASLEEP, PATIENT IS  A/O X 1, SPEAKING New Zealander.NO PAIN AND DISCOMFORT NOTED 
AT THIS TIME. IV ACCESS AT R UA MIDLINE, PATENT AND  INTACT. R AND L GROIN OLD HD CATH 
NOTED. TELE MONITOR READS SR, HR= 66. ON RA, NO S/S OF SOB NOTED AT THIS TIME. PROCEDURE 
RESCHEDULED FOR 2/15/23. PT  RESUMED ON RENAL DIET. ALL  SAFETY MEASURES IN PLACE: BED 
LOCKED AND IN LOWEST POSITION, CALL LIGHT AND TRAY TABLE WITHIN REACH, SIDE RAILS X 3,  
ENDORSED TO PM SHIFT.

## 2023-02-13 NOTE — NUR
TELE RN NOTE



HD INFORMED THAT PATIENT'S HEMODIALYSIS TREATMENT WILL BE STOPPED DUE TO HD LINE COMPROMISED 
AND LINE NOTED WITH CLOTS. CHARGE NURSE AND DR RODRIGUEZ MADE AWARE. VITAL SIGNS AS FOLLOWS: 
BP 98/63, HR 65, RR 20, TEMP 98.0

## 2023-02-13 NOTE — NUR
TELE RN NOTES: 



PT RECEIVED IN BED, ASLEEP BUT EASILY ROUSED ON VERBAL & TACTILE STIMULI. A/O X 1, SPEAKING 
Greenlandic-CNA AS . NO PAIN/DISCOMFORT NOTED AT THIS TIME. NO IV ACCESS AT THIS 
TIME, PENDING MIDLINE INSERTION. ON RA, NO SIGNS OF ACUTE RESPIRATORY DISTRESS. PT IS ON NPO 
STATUS PENDING PERMA-CATH PLACEMENT. L GROIN OLD AV SHUNT AND RIGHT GROIN AV SHUNT NOTED.  
SAFETY MEASURES IN PLACE: BED LOCKED AND IN LOWEST POSITION, CALL LIGHT AND TRAY TABLE 
WITHIN REACH, SIDE RAILS X 3WILL CONTINUE TO MONITOR THE PATIENT.

-------------------------------------------------------------------------------

Addendum: 02/13/23 at 2028 by BUZZ HERMAN RN

-------------------------------------------------------------------------------

TELE MONITOR READS SR, HR= 64

## 2023-02-13 NOTE — NUR
TELE RN OPENING NOTE



PATIENT IN  BED, ASLEEP, A/O X 1. STABLE ON ROOM AIR. BREATHING EQUALLY AND UNLABORED. WITH 
KENZIE MIDLINE ACCESS, INTACT AND PATENT; WITH HD CATH ON LEFT AND RIGHT GROIN; HOOKED TO 
CARDIAC MONITOR CURRENTLY READING SR 65 BPM; SAFETY PRECAUTIONS MAINTAINED: CALL LIGHT AND 
TABLE WITHIN REACH, SIDE RAILS UP X 4, BED IN LOWEST LOCKED POSITION. VITAL SIGNS TAKEN. 
WILL CONTINUE TO MONITOR THROUGHOUT SHIFT.

## 2023-02-14 VITALS — DIASTOLIC BLOOD PRESSURE: 52 MMHG | SYSTOLIC BLOOD PRESSURE: 89 MMHG

## 2023-02-14 VITALS — SYSTOLIC BLOOD PRESSURE: 102 MMHG | DIASTOLIC BLOOD PRESSURE: 73 MMHG

## 2023-02-14 VITALS — SYSTOLIC BLOOD PRESSURE: 110 MMHG | DIASTOLIC BLOOD PRESSURE: 65 MMHG

## 2023-02-14 VITALS — SYSTOLIC BLOOD PRESSURE: 90 MMHG | DIASTOLIC BLOOD PRESSURE: 50 MMHG

## 2023-02-14 VITALS — SYSTOLIC BLOOD PRESSURE: 133 MMHG | DIASTOLIC BLOOD PRESSURE: 53 MMHG

## 2023-02-14 VITALS — DIASTOLIC BLOOD PRESSURE: 65 MMHG | SYSTOLIC BLOOD PRESSURE: 119 MMHG

## 2023-02-14 VITALS — SYSTOLIC BLOOD PRESSURE: 108 MMHG | DIASTOLIC BLOOD PRESSURE: 66 MMHG

## 2023-02-14 RX ADMIN — DEXTROSE MONOHYDRATE SCH MLS/HR: 50 INJECTION, SOLUTION INTRAVENOUS at 12:36

## 2023-02-14 RX ADMIN — RENAGEL SCH MG: 800 TABLET ORAL at 13:00

## 2023-02-14 RX ADMIN — HEPARIN SODIUM SCH UNITS: 5000 INJECTION INTRAVENOUS; SUBCUTANEOUS at 08:59

## 2023-02-14 RX ADMIN — MIDODRINE HYDROCHLORIDE SCH MG: 5 TABLET ORAL at 17:00

## 2023-02-14 RX ADMIN — MIDODRINE HYDROCHLORIDE SCH MG: 5 TABLET ORAL at 13:00

## 2023-02-14 RX ADMIN — CINACALCET HYDROCHLORIDE SCH MG: 30 TABLET, COATED ORAL at 08:59

## 2023-02-14 RX ADMIN — FOLIC ACID SCH MG: 1 TABLET ORAL at 08:59

## 2023-02-14 RX ADMIN — FAMOTIDINE SCH MG: 20 TABLET, FILM COATED ORAL at 08:59

## 2023-02-14 RX ADMIN — MIDODRINE HYDROCHLORIDE SCH MG: 5 TABLET ORAL at 08:45

## 2023-02-14 RX ADMIN — FLUDROCORTISONE ACETATE SCH MG: 0.1 TABLET ORAL at 08:58

## 2023-02-14 RX ADMIN — VITAMIN D, TAB 1000IU (100/BT) SCH UNIT: 25 TAB at 08:59

## 2023-02-14 RX ADMIN — RENAGEL SCH MG: 800 TABLET ORAL at 08:59

## 2023-02-14 RX ADMIN — RENAGEL SCH MG: 800 TABLET ORAL at 17:00

## 2023-02-14 NOTE — NUR
RN NOTES:



PT REFUSED LAB X2 @ 0600 PER PM RN AND 0915, LAB WILL CANCEL ORDERS, MADE CHARGE RN AWARE

## 2023-02-14 NOTE — NUR
RN NOTES:



PT'S FAMILY CALLED BACK, RN SPOKE TO SON BY PHONE LEIDA, TRANSLATED BY FAMILY FRIEND. PER 
MD, INQUIRE IF FAMILY WISHES TO HAVE PEG TUBE PLACED FOR PT DUE TO PT'S REFUSAL TO EAT. 
FAMILY REFUSED PEG TUBE PLACEMENT FOR NOW, STATES WILL BRING PT FOOD FROM HOME AS SHE 
DOESN'T LIKE HOSPITAL FOOD. RN GAVE MD CONTACT INFO TO FAMILY, VERBALIZED UNDERSTANDING. 

-------------------------------------------------------------------------------

Addendum: 02/14/23 at 1943 by BUZZ HERMAN RN

-------------------------------------------------------------------------------

RN SPOKE TO MARIEL ROMA yes

## 2023-02-14 NOTE — NUR
RN OPENING NOTES:



RECEIVED PATIENT IN  BED, ASLEEP, EASILY ROUSED BY TACTILE STIMULI,  A/O X 1. STABLE ON ROOM 
AIR, BREATHING EVENLY AND UNLABORED. NO S/S OF PAIN VIA FLACC. IV ACCESS AT KENZIE MIDLINE, 
INTACT AND PATENT; WITH OLD HD CATH ON LEFT AND RIGHT GROIN. CARDIAC MONITOR CURRENTLY 
READING SR, HR = 67. ; SAFETY PRECAUTIONS MAINTAINED: CALL LIGHT AND TABLE WITHIN REACH, 
WILL CONT WITH PLAN OF CARE DURING SHIFT.

## 2023-02-14 NOTE — NUR
RN NOTES:



CALLED PT'S FAMILY, LEFT MSG ON VOICEMAIL TO CALL BACK, MD WANTS TO DISCUSS POSSIBLE PEG 
PLACEMENT

## 2023-02-14 NOTE — NUR
TELE RN OPENING NOTE



RECEIVED PATIENT FROM AM NURSE; PATIENT IS A/O X 1, CONFUSED, EATING, FAMILY ON BEDSIDE; 
STABLE ON ROOM AIR, BREATHING EVENLY AND TOLERATING WELL; WITH MIDLINE ACCESS ON KENZIE; WITH 
HD CATH ON LEFT AND RIGHT GROIN BUT COMPROMISED AS PER HD NURSE; HOOKED TO CARDIAC 
MONITORING CURRENTLY READING SINUS RHYTHM 60-70S BPMS; NO COMPLAINTS OF PAIN AND DISCOMFORT 
AT THIS TIME; SAFETY PRECAUTIONS IMPLEMENTED, BED IN LOW POSITION, LOCKED, SIDE RAILS UP X 
2, CALL LIGHT WITHIN REACH; WILL CONTINUE TO MONITOR THROUGHOUT SHIFT

## 2023-02-14 NOTE — NUR
RN NOTES:



PT WAS ABLE TO TAKE 1/2 PROSTAT SUPPLEMENT AND MIDODRINE, PT REFUSED ALL PO MEDS, PT CLOSED 
LIPS TIGHTLY, RN EXPLAINED PURPOSE OF MEDICATION, PT STILL REFUSED



HELD HEPARIN, PENDING PERMACATH PLACEMENT

## 2023-02-14 NOTE — NUR
TELE RN CLOSING NOTE 



PATIENT IN  BED, ASLEEP, A/O X 1. STABLE ON ROOM AIR, BREATHING EVENLY AND UNLABORED. WITH 
KENZIE MIDLINE ACCESS, INTACT AND PATENT; WITH HD CATH ON LEFT AND RIGHT GROIN, BUT COMPROMISED 
AS PER HD NURSE; HOOKED TO CARDIAC MONITOR CURRENTLY READING SR 60-70S BPM; PATIENT'S NEEDS 
ATTENDED; MONITORED PATIENT ACCORDINGLY; PATIENT REFUSED BLOOD DRAWN TAKEN; SAFETY 
PRECAUTIONS MAINTAINED: CALL LIGHT AND TABLE WITHIN REACH, SIDE RAILS UP X 4, BED IN LOWEST 
LOCKED POSITION. VITAL SIGNS TAKEN. WILL ENDORSE TO AM NURSE FOR OPAL.

## 2023-02-14 NOTE — NUR
RN CLOSING NOTES:



PATIENT IN  BED, ASLEEP, EASILY ROUSED BY TACTILE STIMULI AND NAME A/O X 1. STABLE ON ROOM 
AIR, BREATHING EVENLY AND UNLABORED. NO S/S OF PAIN VIA FLACC. IV ACCESS AT KENZIE MIDLINE, 
INTACT BUT FLUSHES WITH RESISTANCE. OLD HD CATH ON LEFT AND RIGHT GROIN NOTED. CARDIAC 
MONITOR CURRENTLY READING SR, HR = 66. KEPT PT CLEAN AND DRY AND COMFORTABLE.  SAFETY 
PRECAUTIONS MAINTAINED: CALL LIGHT AND TABLE WITHIN REACH, WILL ENDORSE TO PM SHIFT.

## 2023-02-14 NOTE — NUR
RN NOTES: 



PT REFUSED PO MEDS, CNA TRANSLATED AND EXPLAINED IMPORTANCE OF TAKING MEDICATION, PT STILL 
REFUSED, CLOSED MOUTH TIGHTLY.

## 2023-02-14 NOTE — NUR
RN NOTES:



PT'S KENZIE MIDLINE FLUSHES WITH RESISTANCE, UNABLE TO FINISH IV ABX DUE TO CONSTANT BEEPING, 
MADE CHARGE RN AWARE

## 2023-02-15 VITALS — DIASTOLIC BLOOD PRESSURE: 61 MMHG | SYSTOLIC BLOOD PRESSURE: 91 MMHG

## 2023-02-15 VITALS — SYSTOLIC BLOOD PRESSURE: 126 MMHG | DIASTOLIC BLOOD PRESSURE: 63 MMHG

## 2023-02-15 VITALS — SYSTOLIC BLOOD PRESSURE: 108 MMHG | DIASTOLIC BLOOD PRESSURE: 62 MMHG

## 2023-02-15 VITALS — DIASTOLIC BLOOD PRESSURE: 66 MMHG | SYSTOLIC BLOOD PRESSURE: 119 MMHG

## 2023-02-15 VITALS — DIASTOLIC BLOOD PRESSURE: 53 MMHG | SYSTOLIC BLOOD PRESSURE: 100 MMHG

## 2023-02-15 LAB
ALBUMIN SERPL BCP-MCNC: 2.3 G/DL (ref 3.4–5)
ALP SERPL-CCNC: 269 U/L (ref 46–116)
ALT SERPL W P-5'-P-CCNC: 11 U/L (ref 12–78)
AST SERPL W P-5'-P-CCNC: 12 U/L (ref 15–37)
BASOPHILS # BLD AUTO: 0 K/UL (ref 0–0.2)
BASOPHILS NFR BLD AUTO: 0.4 % (ref 0–2)
BILIRUB SERPL-MCNC: 0.5 MG/DL (ref 0.2–1)
BUN SERPL-MCNC: 95 MG/DL (ref 7–18)
CALCIUM SERPL-MCNC: 7 MG/DL (ref 8.5–10.1)
CHLORIDE SERPL-SCNC: 100 MMOL/L (ref 98–107)
CO2 SERPL-SCNC: 15 MMOL/L (ref 21–32)
CREAT SERPL-MCNC: 7.5 MG/DL (ref 0.6–1.3)
EOSINOPHIL NFR BLD AUTO: 0.1 % (ref 0–6)
GLUCOSE SERPL-MCNC: 79 MG/DL (ref 74–106)
HCT VFR BLD AUTO: 34 % (ref 33–45)
HGB BLD-MCNC: 10.5 G/DL (ref 11.5–14.8)
LYMPHOCYTES NFR BLD AUTO: 0.4 K/UL (ref 0.8–4.8)
LYMPHOCYTES NFR BLD AUTO: 5.9 % (ref 20–44)
LYMPHOCYTES NFR BLD MANUAL: 3 % (ref 16–48)
MAGNESIUM SERPL-MCNC: 2.4 MG/DL (ref 1.8–2.4)
MCHC RBC AUTO-ENTMCNC: 31 G/DL (ref 31–36)
MCV RBC AUTO: 96 FL (ref 82–100)
MONOCYTES NFR BLD AUTO: 0.4 K/UL (ref 0.1–1.3)
MONOCYTES NFR BLD AUTO: 6 % (ref 2–12)
MONOCYTES NFR BLD MANUAL: 4 % (ref 0–11)
NEUTROPHILS # BLD AUTO: 5.6 K/UL (ref 1.8–8.9)
NEUTROPHILS NFR BLD AUTO: 87.6 % (ref 43–81)
NEUTS BAND NFR BLD MANUAL: 5 % (ref 0–5)
NEUTS SEG NFR BLD MANUAL: 88 % (ref 42–76)
PHOSPHATE SERPL-MCNC: 6.5 MG/DL (ref 2.5–4.9)
PLATELET # BLD AUTO: 41 K/UL (ref 150–450)
POTASSIUM SERPL-SCNC: 3.7 MMOL/L (ref 3.5–5.1)
PROT SERPL-MCNC: 5.9 G/DL (ref 6.4–8.2)
RBC # BLD AUTO: 3.5 MIL/UL (ref 4–5.2)
SODIUM SERPL-SCNC: 136 MMOL/L (ref 136–145)
WBC NRBC COR # BLD AUTO: 6.4 K/UL (ref 4.3–11)

## 2023-02-15 PROCEDURE — B51BYZA FLUOROSCOPY OF RIGHT LOWER EXTREMITY VEINS USING OTHER CONTRAST, GUIDANCE: ICD-10-PCS | Performed by: SURGERY

## 2023-02-15 PROCEDURE — B519YZZ FLUOROSCOPY OF INFERIOR VENA CAVA USING OTHER CONTRAST: ICD-10-PCS | Performed by: SURGERY

## 2023-02-15 PROCEDURE — 0J2WXYZ CHANGE OTHER DEVICE IN LOWER EXTREMITY SUBCUTANEOUS TISSUE AND FASCIA, EXTERNAL APPROACH: ICD-10-PCS | Performed by: SURGERY

## 2023-02-15 PROCEDURE — 06HM33Z INSERTION OF INFUSION DEVICE INTO RIGHT FEMORAL VEIN, PERCUTANEOUS APPROACH: ICD-10-PCS | Performed by: SURGERY

## 2023-02-15 PROCEDURE — B514YZZ FLUOROSCOPY OF LEFT JUGULAR VEINS USING OTHER CONTRAST: ICD-10-PCS | Performed by: SURGERY

## 2023-02-15 PROCEDURE — 05HB33Z INSERTION OF INFUSION DEVICE INTO RIGHT BASILIC VEIN, PERCUTANEOUS APPROACH: ICD-10-PCS | Performed by: NURSE PRACTITIONER

## 2023-02-15 RX ADMIN — MIDODRINE HYDROCHLORIDE SCH MG: 5 TABLET ORAL at 18:36

## 2023-02-15 RX ADMIN — DEXTROSE MONOHYDRATE SCH MLS/HR: 50 INJECTION, SOLUTION INTRAVENOUS at 15:08

## 2023-02-15 RX ADMIN — MIDODRINE HYDROCHLORIDE SCH MG: 5 TABLET ORAL at 09:05

## 2023-02-15 RX ADMIN — VITAMIN D, TAB 1000IU (100/BT) SCH UNIT: 25 TAB at 09:00

## 2023-02-15 RX ADMIN — RENAGEL SCH MG: 800 TABLET ORAL at 18:35

## 2023-02-15 RX ADMIN — FAMOTIDINE SCH MG: 20 TABLET, FILM COATED ORAL at 09:06

## 2023-02-15 RX ADMIN — MIDODRINE HYDROCHLORIDE SCH MG: 5 TABLET ORAL at 15:12

## 2023-02-15 RX ADMIN — CINACALCET HYDROCHLORIDE SCH MG: 30 TABLET, COATED ORAL at 09:00

## 2023-02-15 RX ADMIN — FLUDROCORTISONE ACETATE SCH MG: 0.1 TABLET ORAL at 09:00

## 2023-02-15 RX ADMIN — RENAGEL SCH MG: 800 TABLET ORAL at 09:00

## 2023-02-15 RX ADMIN — RENAGEL SCH MG: 800 TABLET ORAL at 15:13

## 2023-02-15 RX ADMIN — MIDODRINE HYDROCHLORIDE SCH MG: 5 TABLET ORAL at 09:00

## 2023-02-15 RX ADMIN — FOLIC ACID SCH MG: 1 TABLET ORAL at 09:00

## 2023-02-15 NOTE — NUR
SURGERY FOR PERMA-CATHETER PLACEMENT



Patient departed [address] [place] [address] for the OR via her bed, alert, but confused per prior baseline, 
hypotensive (informed surgeon who said to hold midodrine and that surgery would manage blood 
pressure).  Vital signs otherwise stable with oxygen saturation in upper 90s percentile on 
room air and afebrile.

## 2023-02-15 NOTE — NUR
TELE RN CLOSING NOTE



PATIENT IS ASLEEP,  A/O X 1, CONFUSED; STABLE ON ROOM AIR, BREATHING EVENLY AND TOLERATING 
WELL; WITH MIDLINE ACCESS ON KENZIE BUT REINSERTION OF MIDLINE WAS ORDERED; WITH HD CATH ON 
LEFT AND RIGHT GROIN BUT COMPROMISED AS PER HD NURSE; HOOKED TO CARDIAC MONITORING CURRENTLY 
READING SINUS RHYTHM 60-70S BPMS; PATIENT'S NEEDS ATTENDED; MONITORED PATIENT ACCORDINGLY; 
NO COMPLAINTS OF PAIN AND DISCOMFORT AT THIS TIME; SAFETY PRECAUTIONS IMPLEMENTED, BED IN 
LOW POSITION, LOCKED, SIDE RAILS UP X 2, CALL LIGHT WITHIN REACH; WILL ENDORSE TO NEXT SHIFT 
NURSE FOR OPAL

## 2023-02-15 NOTE — NUR
MIDLINE PLACEMENT



Patient tolerated well the insertion of a new midline IV catheter to right upper arm just 
above the right antecubital space without complications.

## 2023-02-15 NOTE — NUR
PATIENT RECEIVED FROM DAY SHIFT NURSE SLEEPING BUT AROUSABLE. VITAL SIGNS STABLE. RIGHT 
INNER THIGH RED WITH SMALL SKIN TEAR NOTED. LEFT LATERAL HIP OLD WOUND NOTED. CARLOS EDUARDO LOWER EXT 
SMALL SCALBS NOTED. SMALL OPEN WOUND IN TAIL BONE NOTED. LEFT ARM MIDLINE INTACT AND PATENT. 
LEFT GROIN DRESSING DRY AND INTACT. RIGHT GROIN TEMPORARY DIALYSIS CATHETER DRY AND INTACT. 
PM CARE GIVEN. DIAPER CHANGED. PT HAD SMALL BOWEL MOVEMENT.  PATIENT RESTING COMFORTABLY IN 
BED. NO ACUTE DISTRESS NOTED.

## 2023-02-15 NOTE — NUR
TELE RN OPENING NOTE:



RECEIVED PATIENT IN  BED, ASLEEP, EASILY ROUSED BY TACTILE STIMULI,  A/O X 1. STABLE ON ROOM 
AIR, BREATHING EVENLY AND UNLABORED. NO S/S OF PAIN VIA FLACC. IV ACCESS AT [facility] MIDLINE, 
INTACT AND UNABLE TO BE FLUSHED (MD ORDER TO PLACE NEW MIDLINE IS PENDING).  OLD HD 
CATHETERS INTACT ON LEFT AND RIGHT GROINS. CARDIAC MONITOR CURRENTLY READING SR, HR IN 60S 
BPM.  SAFETY PRECAUTIONS MAINTAINED: CALL LIGHT AND TABLE WITHIN REACH; BED BRAKES LOCKED; 
BED ALARM ON; BED IN LOWEST POSITION; AND THREE BED SIDE RAILS ARE UP FOR SAFETY.  PATIENT 
MAINTAINED ON NPO STATUS FOR SURGERY TODAY TO PLACE NEW PERMA-CATHETER.  WILL CONTINUE WITH 
PLAN OF CARE DURING SHIFT.

## 2023-02-15 NOTE — NUR
MS RN CLOSING NOTE



Patient given scheduled medications post surgery.  Pt has intact, patent midline to right 
upper arm, saline locked.  In bed receiving hemodialysis treatment now through temporary 
vas-cath at right groin.  Patient's renal diet resumed, ate 50% of dinner.  Will endorse POC 
to night Mirna JONES for OPAL.

-------------------------------------------------------------------------------

Addendum: 02/15/23 at 2137 by HAKAN GODFREY RN

-------------------------------------------------------------------------------

Telemetry monitor was discontinued by MD.

## 2023-02-15 NOTE — NUR
RETURN FROM SURGERY



Received patient status post surgery attempt to insert perma-catheter for hemodialysis.  
Surgical team unable to place perm-catheter, but did remove old groin site catheters and 
place a new temporary vas-cath to patient's right groin.  Right groin site is clean,dry, 
intact without signs of bleeding nor bruising nor edema.  Vital signs stable except 
hypotension.  Will resume PO medications and diet as ordered by MD.  Point-of-care accucheck 
blood glucose = 129 mg/dL.

## 2023-02-16 VITALS — DIASTOLIC BLOOD PRESSURE: 64 MMHG | SYSTOLIC BLOOD PRESSURE: 92 MMHG

## 2023-02-16 VITALS — DIASTOLIC BLOOD PRESSURE: 48 MMHG | SYSTOLIC BLOOD PRESSURE: 104 MMHG

## 2023-02-16 LAB
BUN SERPL-MCNC: 93 MG/DL (ref 7–18)
CALCIUM SERPL-MCNC: 6.9 MG/DL (ref 8.5–10.1)
CHLORIDE SERPL-SCNC: 100 MMOL/L (ref 98–107)
CO2 SERPL-SCNC: 19 MMOL/L (ref 21–32)
CREAT SERPL-MCNC: 6.9 MG/DL (ref 0.6–1.3)
GLUCOSE SERPL-MCNC: 101 MG/DL (ref 74–106)
POTASSIUM SERPL-SCNC: 3.6 MMOL/L (ref 3.5–5.1)
SODIUM SERPL-SCNC: 137 MMOL/L (ref 136–145)

## 2023-02-16 RX ADMIN — DEXTROSE MONOHYDRATE SCH MLS/HR: 50 INJECTION, SOLUTION INTRAVENOUS at 12:16

## 2023-02-16 RX ADMIN — MIDODRINE HYDROCHLORIDE SCH MG: 5 TABLET ORAL at 17:00

## 2023-02-16 RX ADMIN — HALOPERIDOL SCH MG: 1 TABLET ORAL at 17:00

## 2023-02-16 RX ADMIN — MIDODRINE HYDROCHLORIDE SCH MG: 5 TABLET ORAL at 13:24

## 2023-02-16 RX ADMIN — RENAGEL SCH MG: 800 TABLET ORAL at 17:00

## 2023-02-16 RX ADMIN — CINACALCET HYDROCHLORIDE SCH MG: 30 TABLET, COATED ORAL at 09:00

## 2023-02-16 RX ADMIN — VITAMIN D, TAB 1000IU (100/BT) SCH UNIT: 25 TAB at 09:00

## 2023-02-16 RX ADMIN — HALOPERIDOL SCH MG: 1 TABLET ORAL at 13:22

## 2023-02-16 RX ADMIN — RENAGEL SCH MG: 800 TABLET ORAL at 13:24

## 2023-02-16 RX ADMIN — FAMOTIDINE SCH MG: 20 TABLET, FILM COATED ORAL at 09:00

## 2023-02-16 RX ADMIN — MIDODRINE HYDROCHLORIDE SCH MG: 5 TABLET ORAL at 09:00

## 2023-02-16 RX ADMIN — RENAGEL SCH MG: 800 TABLET ORAL at 09:00

## 2023-02-16 RX ADMIN — FOLIC ACID SCH MG: 1 TABLET ORAL at 09:00

## 2023-02-16 RX ADMIN — FLUDROCORTISONE ACETATE SCH MG: 0.1 TABLET ORAL at 09:00

## 2023-02-16 NOTE — NUR
REPORT GIVEN TO INCOMING NURSE. PATIENT IN STABLE CONDITION. PATIENT RESTING IN BED 
COMFORTABLY. NO COMPLAIN OF PAIN. NO SHORTNESS OF BREATH OR ACUTE DISTRESS NOTED.

## 2023-02-16 NOTE — NUR
RN CLOSING NOTE- PT IN  BED, ASLEEP, AWAKENS,  A/O X 1. STABLE ON ROOM AIR. NO S/S OF PAIN. 
IV ACCESS AT K[facility]IE MIDLINE,  HD CATH RT FEMO.  SAFETY PRECAUTIONS MAINTAINED: CALL LIGHT AND 
TABLE WITHIN REACH; BED BRAKES LOCKED; BED ALARM ON; BED IN LOWEST POSITION; AND THREE BED 
SIDE RAILS ARE UP FOR SAFETY. FAMILY DECIDING ON HOSPICE. CM TO ASSESS

## 2023-02-16 NOTE — NUR
RN OPENING NOTE- PT IN  BED, ASLEEP, AWAKENS,  A/O X 1. A BIT LETHARGIC, STABLE ON ROOM AIR. 
NO S/S OF PAIN. IV ACCESS AT KENZIE MIDLINE,  HD CATH RT FEMO.  SAFETY PRECAUTIONS MAINTAINED: 
CALL LIGHT AND TABLE WITHIN REACH; BED BRAKES LOCKED; BED ALARM ON; BED IN LOWEST POSITION; 
AND THREE BED SIDE RAILS ARE UP FOR SAFETY.

## 2023-02-16 NOTE — NUR
MS RN OPENING NOTE



RECEIVED PT IN  BED, ASLEEP, EASILY AROUSABLE. A/O X1, Kiswahili SPEAKING, CONFUSED. ON RA 
WITH NO SOB OR RESPIRATORY DISTRESS NOTED. IV ACCESS AT K[facility] MIDLINE SL PATENT AND INTACT. HD 
CATH AT RT FEMO NOTED. SAFETY PRECAUTIONS IN PLACE: CALL LIGHT AND TABLE WITHIN REACH; BED 
BRAKES LOCKED; BED ALARM ON; BED IN LOWEST POSITION; SIDE RAILS UP X2. WILL CONTINUE TO 
MONITOR AND ASSIST.

## 2023-02-16 NOTE — NUR
RN NOTE- EXCORIATION TO MEDIAL THIGHS NOTED ON ASSESSMENT. WOUND CARE CONSULT ORDERED. 
PHOTOS TAKEN.

## 2023-02-16 NOTE — NUR
RN NOTE- UNABLE TO ADMINISTER MORNING MEDS. PT LETHARGIC AND SOMNOLENT. PT RESPONSIVE THOUGH 
CONFUSED. WILL MONITOR

## 2023-02-16 NOTE — NUR
RN NOTE- PT REFUSING RX MOST OF DAY. FAMILY AT BEDSIDE. PT REFUSED PUREED DIET AS WELL. 
ENCOURAGED COMPLIANCE

## 2023-02-16 NOTE — NUR
WOUND CARE CONSULT/FOLLOW UP: PT NOTED TO HAVE DRY ESCHARS TO RT BUTTOCK, LEFT HIP AND 
SACRAL DEEP TISSUE INJURY, ALL OF WHICH WERE NOTED TO BE PRESENT ON ADMISSION. PT NOTED TO 
HAVE SURGICAL DRESSING TO LEFT GROIN AREA AND NEW RT FEMORAL CATHETER AS WELL AS 
EXCORIATED/IRRITATED  AREA TO RT INNER THIGH WITH SCANT BLEEDING. DR CUETO CALLED FOR 
SURGICAL CONSULT ON WOUNDS. DISCUSSED SKIN PROTECTION RECOMMENDATIONS WITH NURSING STAFF. MD 
IN AGREEMENT WITH PLAN OF CARE.

## 2023-02-17 VITALS — DIASTOLIC BLOOD PRESSURE: 59 MMHG | SYSTOLIC BLOOD PRESSURE: 109 MMHG

## 2023-02-17 RX ADMIN — RENAGEL SCH MG: 800 TABLET ORAL at 16:50

## 2023-02-17 RX ADMIN — FAMOTIDINE SCH MG: 20 TABLET, FILM COATED ORAL at 08:52

## 2023-02-17 RX ADMIN — DEXTROSE MONOHYDRATE SCH MLS/HR: 50 INJECTION, SOLUTION INTRAVENOUS at 11:38

## 2023-02-17 RX ADMIN — MIDODRINE HYDROCHLORIDE SCH MG: 5 TABLET ORAL at 16:50

## 2023-02-17 RX ADMIN — RENAGEL SCH MG: 800 TABLET ORAL at 08:52

## 2023-02-17 RX ADMIN — RENAGEL SCH MG: 800 TABLET ORAL at 12:09

## 2023-02-17 RX ADMIN — FOLIC ACID SCH MG: 1 TABLET ORAL at 08:52

## 2023-02-17 RX ADMIN — VITAMIN D, TAB 1000IU (100/BT) SCH UNIT: 25 TAB at 08:52

## 2023-02-17 RX ADMIN — HALOPERIDOL SCH MG: 1 TABLET ORAL at 16:50

## 2023-02-17 RX ADMIN — CINACALCET HYDROCHLORIDE SCH MG: 30 TABLET, COATED ORAL at 08:52

## 2023-02-17 RX ADMIN — MIDODRINE HYDROCHLORIDE SCH MG: 5 TABLET ORAL at 12:09

## 2023-02-17 RX ADMIN — MIDODRINE HYDROCHLORIDE SCH MG: 5 TABLET ORAL at 08:52

## 2023-02-17 RX ADMIN — FLUDROCORTISONE ACETATE SCH MG: 0.1 TABLET ORAL at 08:51

## 2023-02-17 RX ADMIN — HALOPERIDOL SCH MG: 1 TABLET ORAL at 08:52

## 2023-02-17 NOTE — NUR
RN NOTE



HD NURSE JAC INITIATED HD BUT WITH HIGH ARTERIAL PRESSURE. HD NURSE JAC FLUSHED LINE, 
REVERSED, AND REPOSITIONED PATIENT BUT STILL WITH HIGH ARTERIAL. HD TERMINATED. DR. RODRIGUEZ MADE AWARE.

## 2023-02-17 NOTE — NUR
RN NOTE



PATIENT REFUSED AFTERNOON MEDS, FAMILY AT BEDSIDE, TRIED TO HELP GIVING MEDICATION BUT PT 
SPIT OUT MEDICATION.

## 2023-02-17 NOTE — NUR
RN CLOSING NOTE



PATIENT IN  BED, ASLEEP, EASILY AROUSED, A/O X1, Dutch SPEAKING, CONFUSED. REMAINS STABLE 
ON ROOM AIR, NO SOB OR RESPIRATORY DISTRESS NOTED.  IV ACCESS AT RIGHT UPPER ARM MIDLINE, 
INTACT AND PATENT, SALINE LOCKED. HD CATH ON RIGHT T FEMORAL NOTED, WITH DRESSING 
C/D/I.PATIENT STILL WITH EPISODES OF REFUSING MEDS AND MEALS. SAFETY PRECAUTIONS MAINTAINED, 
BED IN LOW AND LOCKED POSITION, SIDE RAILS UP X2, CALL LIGHT PLACED WITHIN EASY REACH. WILL 
ENDORSE TO NEXT SHIFT FOR CONTINUITY OF CARE.

## 2023-02-17 NOTE — NUR
RN OPENING NOTE



RECEIVED PATIENT IN  BED, ASLEEP, EASILY AROUSED, A/O X1, Georgian SPEAKING, CONFUSED. ON 
ROOM AIR, TOLERATING WELL. NO SOB OR RESPIRATORY DISTRESS NOTED. NOTED WITH IV ACCESS AT 
RIGHT UPPER ARM MIDLINE, INTACT AND PATENT, SALINE LOCKED. HD CATH AT RT FEMORAL NOTED, WITH 
DRESSING C/D/I. SAFETY PRECAUTIONS IN PLACE, BED IN LOW AND LOCKED POSITION, SIDE RAILS UP 
X2, CALL LIGHT PLACED WITHIN EASY REACH. WILL CONTINUE TO MONITOR PATIENT.

## 2023-02-17 NOTE — NUR
MS LVN INITIAL NOTES

 Received report from am nurse and saw pt in bed alert  with eyes closed while chewing some 
foods brought by her family. she open her eyes when her son talked to her. educate her 
family regarding aspiration precaution and they understood well . no signs of any discomfort 
or any acute distress noted. Per son they will bring her mom back to [place] by Sunday. pt 
more responsive than before specially when her family around. kept her warm and comfortable 
at all times. will continue monitoring.

## 2023-02-17 NOTE — NUR
MS RN CLOSING NOTE



PT IN  BED, ASLEEP, EASILY AROUSABLE. A/O X1, Czech SPEAKING, CONFUSED. STABLE ON RA WITH 
NO SOB OR RESPIRATORY DISTRESS NOTED. IV ACCESS AT [facility]ZIE MIDLINE SL PATENT AND INTACT. HD CATH 
AT RT FEMORAL NOTED. ALL CARE PROVIDED AND MEDS TOLERATED WELL. SAFETY PRECAUTIONS 
MAINTAINED: CALL LIGHT AND TABLE WITHIN REACH; BED BRAKES LOCKED; BED ALARM ON; BED IN 
LOWEST POSITION; SIDE RAILS UP X2. WILL ENDORSE OPAL TO DAY SHIFT NURSE.

## 2023-02-18 LAB
ALBUMIN SERPL BCP-MCNC: 2.2 G/DL (ref 3.4–5)
ALP SERPL-CCNC: 233 U/L (ref 46–116)
ALT SERPL W P-5'-P-CCNC: < 6 U/L (ref 12–78)
AST SERPL W P-5'-P-CCNC: 13 U/L (ref 15–37)
BASOPHILS # BLD AUTO: 0 K/UL (ref 0–0.2)
BASOPHILS NFR BLD AUTO: 0.5 % (ref 0–2)
BILIRUB SERPL-MCNC: 0.4 MG/DL (ref 0.2–1)
BUN SERPL-MCNC: 94 MG/DL (ref 7–18)
CALCIUM SERPL-MCNC: 6.1 MG/DL (ref 8.5–10.1)
CHLORIDE SERPL-SCNC: 102 MMOL/L (ref 98–107)
CO2 SERPL-SCNC: 19 MMOL/L (ref 21–32)
CREAT SERPL-MCNC: 7.2 MG/DL (ref 0.6–1.3)
EOSINOPHIL NFR BLD AUTO: 0.4 % (ref 0–6)
GLUCOSE SERPL-MCNC: 86 MG/DL (ref 74–106)
HCT VFR BLD AUTO: 33 % (ref 33–45)
HGB BLD-MCNC: 9.9 G/DL (ref 11.5–14.8)
LYMPHOCYTES NFR BLD AUTO: 0.5 K/UL (ref 0.8–4.8)
LYMPHOCYTES NFR BLD AUTO: 12.9 % (ref 20–44)
LYMPHOCYTES NFR BLD MANUAL: 12 % (ref 16–48)
MAGNESIUM SERPL-MCNC: 2.4 MG/DL (ref 1.8–2.4)
MCHC RBC AUTO-ENTMCNC: 30 G/DL (ref 31–36)
MCV RBC AUTO: 99 FL (ref 82–100)
MONOCYTES NFR BLD AUTO: 0.3 K/UL (ref 0.1–1.3)
MONOCYTES NFR BLD AUTO: 8.3 % (ref 2–12)
MONOCYTES NFR BLD MANUAL: 6 % (ref 0–11)
NEUTROPHILS # BLD AUTO: 3.1 K/UL (ref 1.8–8.9)
NEUTROPHILS NFR BLD AUTO: 77.9 % (ref 43–81)
NEUTS BAND NFR BLD MANUAL: 3 % (ref 0–5)
NEUTS SEG NFR BLD MANUAL: 79 % (ref 42–76)
PHOSPHATE SERPL-MCNC: 5.5 MG/DL (ref 2.5–4.9)
PLATELET # BLD AUTO: 36 K/UL (ref 150–450)
POTASSIUM SERPL-SCNC: 3.9 MMOL/L (ref 3.5–5.1)
PROT SERPL-MCNC: 5.9 G/DL (ref 6.4–8.2)
RBC # BLD AUTO: 3.3 MIL/UL (ref 4–5.2)
SODIUM SERPL-SCNC: 137 MMOL/L (ref 136–145)
WBC NRBC COR # BLD AUTO: 4 K/UL (ref 4.3–11)

## 2023-02-18 RX ADMIN — FOLIC ACID SCH MG: 1 TABLET ORAL at 09:00

## 2023-02-18 RX ADMIN — FAMOTIDINE SCH MG: 20 TABLET, FILM COATED ORAL at 09:00

## 2023-02-18 RX ADMIN — HALOPERIDOL SCH MG: 1 TABLET ORAL at 09:00

## 2023-02-18 RX ADMIN — RENAGEL SCH MG: 800 TABLET ORAL at 13:00

## 2023-02-18 RX ADMIN — MIDODRINE HYDROCHLORIDE SCH MG: 5 TABLET ORAL at 09:00

## 2023-02-18 RX ADMIN — HALOPERIDOL SCH MG: 1 TABLET ORAL at 17:00

## 2023-02-18 RX ADMIN — RENAGEL SCH MG: 800 TABLET ORAL at 17:00

## 2023-02-18 RX ADMIN — RENAGEL SCH MG: 800 TABLET ORAL at 09:00

## 2023-02-18 RX ADMIN — FLUDROCORTISONE ACETATE SCH MG: 0.1 TABLET ORAL at 09:00

## 2023-02-18 RX ADMIN — CINACALCET HYDROCHLORIDE SCH MG: 30 TABLET, COATED ORAL at 09:00

## 2023-02-18 RX ADMIN — MIDODRINE HYDROCHLORIDE SCH MG: 5 TABLET ORAL at 13:00

## 2023-02-18 RX ADMIN — VITAMIN D, TAB 1000IU (100/BT) SCH UNIT: 25 TAB at 09:00

## 2023-02-18 RX ADMIN — MIDODRINE HYDROCHLORIDE SCH MG: 5 TABLET ORAL at 17:00

## 2023-02-18 NOTE — NUR
ROBERT MENDOZA



RECEIVED A CALL FROM [facility] REPORTING CRITICAL LAB RESULT OF PLATELET AT 36. CALLED 
DR. KIANA HARDY AND MADE HIM AWARE OF CRITICAL LAB RESULT WITH NO NEW ORDERS AT THIS 
TIME.

## 2023-02-18 NOTE — NUR
RN NOTE



PT REFUSED ALL HER MEDICATIONS SCHEDULED AT 1300. EXPLAINED RISK AND BENEFITS, PT STILL 
REFUSED

## 2023-02-18 NOTE — NUR
MS LVN NOTES

 PT RESTING WITH EYES CLOSED BUT AROUSE EASILY WHEN SHE HEARD SOMEONE TALKING AND WHEN YOU 
TOUCH HER TOO. NO SIGNS OF ANY DISTRESS OR ANY DISCOMFORT NOTED. KEPT HER WARM AND 
COMFORTABLE AT ALL TIMES. WILL CONTINUE MONITORING.

## 2023-02-18 NOTE — NUR
RN NOTE



PT REFUSED ALL HER MEDICATIONS SCHEDULED AT 1700. EXPLAINED RISK AND BENEFITS, PT STILL 
REFUSED

## 2023-02-18 NOTE — NUR
RN NOTE



PT REFUSED ALL HER MEDICATIONS SCHEDULED AT 0900. EXPLAINED RISK AND BENEFITS, PT STILL 
REFUSED.

## 2023-02-18 NOTE — NUR
MS LVN CLOSING NOTES

 PT BACK TO SLEEP AFTER MORNING CARE DONE. STABLE THROUGHOUT, RESPIRATION EVEN AND 
NON-LABORED, NOT IN ANY ACUTE DISTRESS NOTED. AROUSE TO STIMULI. KEPT HER WARM AND COMFORT 
AT ALL TIMES. BED IN LOW AND LOCK IN POSITION WITH SIDE RAILS X2 UP . WILL ENDORSE TO AM 
SHIFT FOR CONTINUITY OF CARE.

## 2025-04-21 NOTE — NUR
MS RN OPENING NOTE



RECEIVED PT ASLEEP IN BED, EASILY AROUSED. PATIENT IS ALERT AND ORIENTED X1, REORIENTED PT 
AS NEEDED. PT ON ROOM AIR, TOLERATING WELL. NO SOB NOTED AT THIS TIME. NOT IN ANY SIGN OF 
RESPIRATORY DISTRESS. IV ACCESS ON KENZIE MIDLINE G#20 INTACT AND PATENT. SAFETY MEASURES 
INITIATED: BED IN LOWEST AND LOCKED POSITION, SIDE RAILS UP X2, AND CALL LIGHT WITHIN REACH. 
WILL CONTINUE TO MONITOR PT. [de-identified] : B mastectomy with reconstruction